# Patient Record
Sex: FEMALE | Race: ASIAN | NOT HISPANIC OR LATINO | Employment: UNEMPLOYED | URBAN - METROPOLITAN AREA
[De-identification: names, ages, dates, MRNs, and addresses within clinical notes are randomized per-mention and may not be internally consistent; named-entity substitution may affect disease eponyms.]

---

## 2020-01-27 ENCOUNTER — OFFICE VISIT - HEALTHEAST (OUTPATIENT)
Dept: FAMILY MEDICINE | Facility: CLINIC | Age: 55
End: 2020-01-27

## 2020-01-27 ENCOUNTER — HOSPITAL ENCOUNTER (OUTPATIENT)
Facility: CLINIC | Age: 55
Setting detail: OBSERVATION
LOS: 1 days | Discharge: HOME | End: 2020-01-28
Attending: FAMILY MEDICINE | Admitting: INTERNAL MEDICINE
Payer: COMMERCIAL

## 2020-01-27 ENCOUNTER — APPOINTMENT (OUTPATIENT)
Dept: GENERAL RADIOLOGY | Facility: CLINIC | Age: 55
End: 2020-01-27
Attending: FAMILY MEDICINE
Payer: COMMERCIAL

## 2020-01-27 ENCOUNTER — APPOINTMENT (OUTPATIENT)
Dept: CT IMAGING | Facility: CLINIC | Age: 55
End: 2020-01-27
Attending: FAMILY MEDICINE
Payer: COMMERCIAL

## 2020-01-27 DIAGNOSIS — R20.2 PARESTHESIAS: ICD-10-CM

## 2020-01-27 DIAGNOSIS — G89.29 CHRONIC NONINTRACTABLE HEADACHE, UNSPECIFIED HEADACHE TYPE: ICD-10-CM

## 2020-01-27 DIAGNOSIS — S09.90XA INJURY OF HEAD, INITIAL ENCOUNTER: ICD-10-CM

## 2020-01-27 DIAGNOSIS — Z11.4 ENCOUNTER FOR SCREENING FOR HIV: ICD-10-CM

## 2020-01-27 DIAGNOSIS — Z11.59 NEED FOR HEPATITIS C SCREENING TEST: ICD-10-CM
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
## 2020-01-27 DIAGNOSIS — R79.89 ELEVATED TROPONIN: ICD-10-CM

## 2020-01-27 DIAGNOSIS — Z11.59 NEED FOR HEPATITIS B SCREENING TEST: ICD-10-CM

## 2020-01-27 DIAGNOSIS — F32.2 SEVERE MAJOR DEPRESSION (H): ICD-10-CM

## 2020-01-27 DIAGNOSIS — R51.9 CHRONIC NONINTRACTABLE HEADACHE, UNSPECIFIED HEADACHE TYPE: ICD-10-CM

## 2020-01-27 DIAGNOSIS — R55 SYNCOPE AND COLLAPSE: ICD-10-CM

## 2020-01-27 DIAGNOSIS — F43.10 PTSD (POST-TRAUMATIC STRESS DISORDER): ICD-10-CM

## 2020-01-27 LAB — HIV 1+2 AB+HIV1 P24 AG SERPL QL IA: NEGATIVE

## 2020-01-27 PROCEDURE — 84443 ASSAY THYROID STIM HORMONE: CPT | Performed by: FAMILY MEDICINE

## 2020-01-27 PROCEDURE — 85610 PROTHROMBIN TIME: CPT | Performed by: FAMILY MEDICINE

## 2020-01-27 PROCEDURE — 86850 RBC ANTIBODY SCREEN: CPT | Performed by: FAMILY MEDICINE

## 2020-01-27 PROCEDURE — 99291 CRITICAL CARE FIRST HOUR: CPT | Mod: 25 | Performed by: FAMILY MEDICINE

## 2020-01-27 PROCEDURE — 83735 ASSAY OF MAGNESIUM: CPT | Performed by: FAMILY MEDICINE

## 2020-01-27 PROCEDURE — 93005 ELECTROCARDIOGRAM TRACING: CPT | Performed by: FAMILY MEDICINE

## 2020-01-27 PROCEDURE — 84484 ASSAY OF TROPONIN QUANT: CPT | Performed by: FAMILY MEDICINE

## 2020-01-27 PROCEDURE — 71046 X-RAY EXAM CHEST 2 VIEWS: CPT

## 2020-01-27 PROCEDURE — 93308 TTE F-UP OR LMTD: CPT | Mod: 26 | Performed by: FAMILY MEDICINE

## 2020-01-27 PROCEDURE — 99285 EMERGENCY DEPT VISIT HI MDM: CPT | Mod: 25 | Performed by: FAMILY MEDICINE

## 2020-01-27 PROCEDURE — 70450 CT HEAD/BRAIN W/O DYE: CPT

## 2020-01-27 PROCEDURE — 93010 ELECTROCARDIOGRAM REPORT: CPT | Mod: Z6 | Performed by: FAMILY MEDICINE

## 2020-01-27 PROCEDURE — 85025 COMPLETE CBC W/AUTO DIFF WBC: CPT | Performed by: FAMILY MEDICINE

## 2020-01-27 PROCEDURE — 25800030 ZZH RX IP 258 OP 636: Performed by: FAMILY MEDICINE

## 2020-01-27 PROCEDURE — 93308 TTE F-UP OR LMTD: CPT | Performed by: FAMILY MEDICINE

## 2020-01-27 PROCEDURE — 86901 BLOOD TYPING SEROLOGIC RH(D): CPT | Performed by: FAMILY MEDICINE

## 2020-01-27 PROCEDURE — 80053 COMPREHEN METABOLIC PANEL: CPT | Performed by: FAMILY MEDICINE

## 2020-01-27 PROCEDURE — 84439 ASSAY OF FREE THYROXINE: CPT | Performed by: FAMILY MEDICINE

## 2020-01-27 PROCEDURE — 96360 HYDRATION IV INFUSION INIT: CPT | Performed by: FAMILY MEDICINE

## 2020-01-27 PROCEDURE — 96374 THER/PROPH/DIAG INJ IV PUSH: CPT | Performed by: FAMILY MEDICINE

## 2020-01-27 PROCEDURE — 96361 HYDRATE IV INFUSION ADD-ON: CPT | Performed by: FAMILY MEDICINE

## 2020-01-27 PROCEDURE — 86900 BLOOD TYPING SEROLOGIC ABO: CPT | Performed by: FAMILY MEDICINE

## 2020-01-27 RX ORDER — LIDOCAINE 40 MG/G
CREAM TOPICAL
Status: DISCONTINUED | OUTPATIENT
Start: 2020-01-27 | End: 2020-01-28 | Stop reason: HOSPADM

## 2020-01-27 RX ORDER — SODIUM CHLORIDE 9 MG/ML
1000 INJECTION, SOLUTION INTRAVENOUS CONTINUOUS
Status: DISCONTINUED | OUTPATIENT
Start: 2020-01-27 | End: 2020-01-28

## 2020-01-27 RX ADMIN — SODIUM CHLORIDE 1000 ML: 9 INJECTION, SOLUTION INTRAVENOUS at 23:05

## 2020-01-27 ASSESSMENT — ENCOUNTER SYMPTOMS
NECK STIFFNESS: 0
CONFUSION: 0
SHORTNESS OF BREATH: 0
FEVER: 0
ARTHRALGIAS: 0
HEADACHES: 1
DIFFICULTY URINATING: 0
ABDOMINAL PAIN: 0
DIARRHEA: 0
EYE REDNESS: 0
COLOR CHANGE: 0

## 2020-01-27 ASSESSMENT — PATIENT HEALTH QUESTIONNAIRE - PHQ9: SUM OF ALL RESPONSES TO PHQ QUESTIONS 1-9: 25

## 2020-01-27 ASSESSMENT — MIFFLIN-ST. JEOR
SCORE: 982.37
SCORE: 993.24

## 2020-01-28 ENCOUNTER — APPOINTMENT (OUTPATIENT)
Dept: CARDIOLOGY | Facility: CLINIC | Age: 55
End: 2020-01-28
Attending: STUDENT IN AN ORGANIZED HEALTH CARE EDUCATION/TRAINING PROGRAM
Payer: COMMERCIAL

## 2020-01-28 VITALS
SYSTOLIC BLOOD PRESSURE: 107 MMHG | BODY MASS INDEX: 27.31 KG/M2 | RESPIRATION RATE: 25 BRPM | WEIGHT: 118 LBS | TEMPERATURE: 98 F | DIASTOLIC BLOOD PRESSURE: 72 MMHG | HEIGHT: 55 IN | OXYGEN SATURATION: 96 % | HEART RATE: 65 BPM

## 2020-01-28 PROBLEM — R55 SYNCOPE: Status: ACTIVE | Noted: 2020-01-28

## 2020-01-28 LAB
25(OH)D3 SERPL-MCNC: 31 NG/ML (ref 30–80)
ABO + RH BLD: NORMAL
ABO + RH BLD: NORMAL
ALBUMIN SERPL-MCNC: 3.7 G/DL (ref 3.4–5)
ALBUMIN UR-MCNC: NEGATIVE MG/DL
ALP SERPL-CCNC: 76 U/L (ref 40–150)
ALT SERPL W P-5'-P-CCNC: 42 U/L (ref 0–50)
ANION GAP SERPL CALCULATED.3IONS-SCNC: 8 MMOL/L (ref 3–14)
APPEARANCE UR: CLEAR
AST SERPL W P-5'-P-CCNC: 23 U/L (ref 0–45)
B BURGDOR IGG+IGM SER QL: 0.09 INDEX VALUE
BACTERIA #/AREA URNS HPF: ABNORMAL /HPF
BASOPHILS # BLD AUTO: 0 10E9/L (ref 0–0.2)
BASOPHILS NFR BLD AUTO: 0.6 %
BILIRUB SERPL-MCNC: 0.2 MG/DL (ref 0.2–1.3)
BILIRUB UR QL STRIP: NEGATIVE
BLD GP AB SCN SERPL QL: NORMAL
BLOOD BANK CMNT PATIENT-IMP: NORMAL
BUN SERPL-MCNC: 11 MG/DL (ref 7–30)
CALCIUM SERPL-MCNC: 8.9 MG/DL (ref 8.5–10.1)
CHLORIDE SERPL-SCNC: 106 MMOL/L (ref 94–109)
CO2 SERPL-SCNC: 24 MMOL/L (ref 20–32)
COLOR UR AUTO: ABNORMAL
CREAT SERPL-MCNC: 0.51 MG/DL (ref 0.52–1.04)
CREAT SERPL-MCNC: 0.62 MG/DL (ref 0.52–1.04)
DIFFERENTIAL METHOD BLD: NORMAL
EOSINOPHIL # BLD AUTO: 0 10E9/L (ref 0–0.7)
EOSINOPHIL NFR BLD AUTO: 0.6 %
ERYTHROCYTE [DISTWIDTH] IN BLOOD BY AUTOMATED COUNT: 11.5 % (ref 10–15)
GFR SERPL CREATININE-BSD FRML MDRD: >90 ML/MIN/{1.73_M2}
GFR SERPL CREATININE-BSD FRML MDRD: >90 ML/MIN/{1.73_M2}
GLUCOSE SERPL-MCNC: 118 MG/DL (ref 70–99)
GLUCOSE UR STRIP-MCNC: NEGATIVE MG/DL
HBV SURFACE AG SERPL QL IA: NEGATIVE
HCT VFR BLD AUTO: 40.6 % (ref 35–47)
HCV AB SERPL QL IA: NEGATIVE
HGB BLD-MCNC: 13.8 G/DL (ref 11.7–15.7)
HGB UR QL STRIP: NEGATIVE
IMM GRANULOCYTES # BLD: 0 10E9/L (ref 0–0.4)
IMM GRANULOCYTES NFR BLD: 0.3 %
INR PPP: 0.98 (ref 0.86–1.14)
INTERPRETATION ECG - MUSE: NORMAL
KETONES UR STRIP-MCNC: NEGATIVE MG/DL
LEUKOCYTE ESTERASE UR QL STRIP: NEGATIVE
LYMPHOCYTES # BLD AUTO: 1.4 10E9/L (ref 0.8–5.3)
LYMPHOCYTES NFR BLD AUTO: 20.6 %
MAGNESIUM SERPL-MCNC: 2.2 MG/DL (ref 1.6–2.3)
MCH RBC QN AUTO: 29.2 PG (ref 26.5–33)
MCHC RBC AUTO-ENTMCNC: 34 G/DL (ref 31.5–36.5)
MCV RBC AUTO: 86 FL (ref 78–100)
MONOCYTES # BLD AUTO: 0.5 10E9/L (ref 0–1.3)
MONOCYTES NFR BLD AUTO: 7 %
NEUTROPHILS # BLD AUTO: 4.9 10E9/L (ref 1.6–8.3)
NEUTROPHILS NFR BLD AUTO: 70.9 %
NITRATE UR QL: NEGATIVE
NRBC # BLD AUTO: 0 10*3/UL
NRBC BLD AUTO-RTO: 0 /100
PH UR STRIP: 6 PH (ref 5–7)
PLATELET # BLD AUTO: 221 10E9/L (ref 150–450)
PLATELET # BLD AUTO: 239 10E9/L (ref 150–450)
POTASSIUM SERPL-SCNC: 3.6 MMOL/L (ref 3.4–5.3)
PROT SERPL-MCNC: 7.2 G/DL (ref 6.8–8.8)
RBC # BLD AUTO: 4.73 10E12/L (ref 3.8–5.2)
RBC #/AREA URNS AUTO: 0 /HPF (ref 0–2)
SODIUM SERPL-SCNC: 138 MMOL/L (ref 133–144)
SOURCE: ABNORMAL
SP GR UR STRIP: 1 (ref 1–1.03)
SPECIMEN EXP DATE BLD: NORMAL
T3FREE SERPL-MCNC: 2.3 PG/ML (ref 2.3–4.2)
T4 FREE SERPL-MCNC: 0.97 NG/DL (ref 0.76–1.46)
TROPONIN I SERPL-MCNC: 0.08 UG/L (ref 0–0.04)
TROPONIN I SERPL-MCNC: 0.14 UG/L (ref 0–0.04)
TROPONIN I SERPL-MCNC: 0.16 UG/L (ref 0–0.04)
TSH SERPL DL<=0.005 MIU/L-ACNC: 7 MU/L (ref 0.4–4)
UROBILINOGEN UR STRIP-MCNC: NORMAL MG/DL (ref 0–2)
WBC # BLD AUTO: 6.9 10E9/L (ref 4–11)
WBC #/AREA URNS AUTO: 0 /HPF (ref 0–5)

## 2020-01-28 PROCEDURE — 93306 TTE W/DOPPLER COMPLETE: CPT | Mod: 26 | Performed by: INTERNAL MEDICINE

## 2020-01-28 PROCEDURE — 96372 THER/PROPH/DIAG INJ SC/IM: CPT | Performed by: FAMILY MEDICINE

## 2020-01-28 PROCEDURE — 82565 ASSAY OF CREATININE: CPT | Performed by: STUDENT IN AN ORGANIZED HEALTH CARE EDUCATION/TRAINING PROGRAM

## 2020-01-28 PROCEDURE — 25000132 ZZH RX MED GY IP 250 OP 250 PS 637: Performed by: FAMILY MEDICINE

## 2020-01-28 PROCEDURE — 84484 ASSAY OF TROPONIN QUANT: CPT | Performed by: PHYSICIAN ASSISTANT

## 2020-01-28 PROCEDURE — 96361 HYDRATE IV INFUSION ADD-ON: CPT

## 2020-01-28 PROCEDURE — 25000128 H RX IP 250 OP 636: Performed by: STUDENT IN AN ORGANIZED HEALTH CARE EDUCATION/TRAINING PROGRAM

## 2020-01-28 PROCEDURE — 84484 ASSAY OF TROPONIN QUANT: CPT | Performed by: STUDENT IN AN ORGANIZED HEALTH CARE EDUCATION/TRAINING PROGRAM

## 2020-01-28 PROCEDURE — 81001 URINALYSIS AUTO W/SCOPE: CPT | Performed by: FAMILY MEDICINE

## 2020-01-28 PROCEDURE — 25800030 ZZH RX IP 258 OP 636: Performed by: PHYSICIAN ASSISTANT

## 2020-01-28 PROCEDURE — 84481 FREE ASSAY (FT-3): CPT | Performed by: STUDENT IN AN ORGANIZED HEALTH CARE EDUCATION/TRAINING PROGRAM

## 2020-01-28 PROCEDURE — 99220 ZZC INITIAL OBSERVATION CARE,LEVL III: CPT | Mod: AI | Performed by: INTERNAL MEDICINE

## 2020-01-28 PROCEDURE — 25500064 ZZH RX 255 OP 636: Performed by: INTERNAL MEDICINE

## 2020-01-28 PROCEDURE — G0378 HOSPITAL OBSERVATION PER HR: HCPCS

## 2020-01-28 PROCEDURE — 25000132 ZZH RX MED GY IP 250 OP 250 PS 637: Performed by: STUDENT IN AN ORGANIZED HEALTH CARE EDUCATION/TRAINING PROGRAM

## 2020-01-28 PROCEDURE — 25000132 ZZH RX MED GY IP 250 OP 250 PS 637

## 2020-01-28 PROCEDURE — 85049 AUTOMATED PLATELET COUNT: CPT | Performed by: STUDENT IN AN ORGANIZED HEALTH CARE EDUCATION/TRAINING PROGRAM

## 2020-01-28 RX ORDER — NITROGLYCERIN 0.4 MG/1
0.4 TABLET SUBLINGUAL EVERY 5 MIN PRN
Status: DISCONTINUED | OUTPATIENT
Start: 2020-01-28 | End: 2020-01-28 | Stop reason: HOSPADM

## 2020-01-28 RX ORDER — LIDOCAINE 40 MG/G
CREAM TOPICAL
Status: DISCONTINUED | OUTPATIENT
Start: 2020-01-28 | End: 2020-01-28 | Stop reason: HOSPADM

## 2020-01-28 RX ORDER — ASPIRIN 81 MG/1
324 TABLET, CHEWABLE ORAL ONCE
Status: COMPLETED | OUTPATIENT
Start: 2020-01-28 | End: 2020-01-28

## 2020-01-28 RX ORDER — MIRTAZAPINE 15 MG/1
15 TABLET, FILM COATED ORAL AT BEDTIME
Status: DISCONTINUED | OUTPATIENT
Start: 2020-01-29 | End: 2020-01-28 | Stop reason: HOSPADM

## 2020-01-28 RX ORDER — SERTRALINE HYDROCHLORIDE 100 MG/1
100 TABLET, FILM COATED ORAL DAILY
Status: DISCONTINUED | OUTPATIENT
Start: 2020-01-28 | End: 2020-01-28 | Stop reason: HOSPADM

## 2020-01-28 RX ORDER — POLYETHYLENE GLYCOL 3350 17 G/17G
17 POWDER, FOR SOLUTION ORAL DAILY PRN
Status: DISCONTINUED | OUTPATIENT
Start: 2020-01-28 | End: 2020-01-28 | Stop reason: HOSPADM

## 2020-01-28 RX ORDER — DOCUSATE SODIUM 100 MG/1
100 CAPSULE, LIQUID FILLED ORAL 2 TIMES DAILY
Status: DISCONTINUED | OUTPATIENT
Start: 2020-01-28 | End: 2020-01-28 | Stop reason: HOSPADM

## 2020-01-28 RX ORDER — SODIUM CHLORIDE 9 MG/ML
INJECTION, SOLUTION INTRAVENOUS CONTINUOUS
Status: DISCONTINUED | OUTPATIENT
Start: 2020-01-28 | End: 2020-01-28 | Stop reason: HOSPADM

## 2020-01-28 RX ORDER — ALUMINA, MAGNESIA, AND SIMETHICONE 2400; 2400; 240 MG/30ML; MG/30ML; MG/30ML
30 SUSPENSION ORAL EVERY 4 HOURS PRN
Status: DISCONTINUED | OUTPATIENT
Start: 2020-01-28 | End: 2020-01-28 | Stop reason: HOSPADM

## 2020-01-28 RX ORDER — ACETAMINOPHEN 650 MG/1
650 SUPPOSITORY RECTAL EVERY 4 HOURS PRN
Status: DISCONTINUED | OUTPATIENT
Start: 2020-01-28 | End: 2020-01-28 | Stop reason: HOSPADM

## 2020-01-28 RX ORDER — ACETAMINOPHEN 325 MG/1
650 TABLET ORAL EVERY 4 HOURS PRN
Status: DISCONTINUED | OUTPATIENT
Start: 2020-01-28 | End: 2020-01-28 | Stop reason: HOSPADM

## 2020-01-28 RX ADMIN — HUMAN ALBUMIN MICROSPHERES AND PERFLUTREN 5 ML: 10; .22 INJECTION, SOLUTION INTRAVENOUS at 07:12

## 2020-01-28 RX ADMIN — SODIUM CHLORIDE: 9 INJECTION, SOLUTION INTRAVENOUS at 08:48

## 2020-01-28 RX ADMIN — ASPIRIN 81 MG 324 MG: 81 TABLET ORAL at 01:42

## 2020-01-28 RX ADMIN — ENOXAPARIN SODIUM 40 MG: 40 INJECTION SUBCUTANEOUS at 08:47

## 2020-01-28 RX ADMIN — DOCUSATE SODIUM 100 MG: 100 CAPSULE, LIQUID FILLED ORAL at 08:47

## 2020-01-28 RX ADMIN — SERTRALINE HYDROCHLORIDE 100 MG: 100 TABLET ORAL at 08:47

## 2020-01-28 ASSESSMENT — ENCOUNTER SYMPTOMS
SPEECH DIFFICULTY: 0
PALPITATIONS: 0
SEIZURES: 0
NAUSEA: 0
APPETITE CHANGE: 0
TROUBLE SWALLOWING: 0
FLANK PAIN: 0
DYSPHORIC MOOD: 0
BRUISES/BLEEDS EASILY: 0
VOICE CHANGE: 0
VOMITING: 0
COUGH: 0
WOUND: 0
HALLUCINATIONS: 0
WEAKNESS: 0
BACK PAIN: 0
ACTIVITY CHANGE: 1
SINUS PAIN: 0
DECREASED CONCENTRATION: 0

## 2020-01-28 NOTE — H&P
"  Cardiology History and Physical  Patient Name: Mehul Ambriz  Age: 54 year old  YOB: 1965  MRN# [id number]  Date of Admission:1/27/2020  Date of Service: 1/28/2020         Assessment and Plan:   Mehul Ambriz is a 53yo La[place]ian F w/ PMHx notable for MDD/PTSD, SI who is admitted following syncopal episode, found to have elevated troponin without EKG changes or chest pain.     #Elevated troponin  Troponin 0.076->0.158 on admission. Denies recent or current chest pain. EKG is without TWI or ST changes suggestive of acute ischemia. Very limited risk factors for true ACS/CAD as no prior h/o CAD, nonsmoker, no alcohol, normal BMI. FHx: mother w/ \"weak heart\" and need for ICD vs PPM. Suspect troponin elevated in setting of demand ischemia w/ recent prodrome of orthostatic spell, syncope. However, will continue to trend trop & EKG  -Trend troponin: 0.076-> 0.158->ordered for 06:30  -S/p 324 ASA  -EKG: NSR, trend PRN  -Admit to cardiology, telemetry  -ECHO in AM    #Syncopal episode  History of blurred vision, unsteadiness, and palpitations following standing quickly from warm bath with recent first time dose of prazosin. No known prior structural heart disease or h/o arrhythmia. No orthostatics taken in ED. S/p 1L IVF. Normotensive. Etiology of fall most c/w orthostatic syncope but cannot yet exclude cardiac w/ rising troponin. Does not appear neuromediated.   -Cardiac eval as noted above  -ECHO ordered for AM  -Up with assist    #MDD/PTSD  Recently admitted 1/13-1/20 for SI. No prior h/o AVH. Started on lorazepam 0.5mg BID.   -Continue PTA Sertraline 100mg, mirtazapine 15mg at bedtime; consider adding lorazepam if needed (not ordered)  -Holding PTA prazosin    #Subclinical Hypothyroidism  TSH last checked 1.45 10/28/19. Now 7.0 on admission (mildly elevated). Does provide h/o symptoms consistent with hypothyroidism (fatigue, poor sleep, constipation) but also c/w MDD.   -Reflex to fT4, T3  -Consider serum " "thyroid peroxidase antibody testing*    FEN: Regular Diet  Ppx: Enoxaparin  Dispo: Suspect will return back home without any additional needs once syncopal work up complete  CODE: DNR/DNI    Patient will be staffed in the AM.    Silvia Hansen MD, MPH  Internal Medicine PGY-2  [facility] [place]    *Pete MENDEZ, Lonny LAY, Emerita PINZON, et al. Screening and Treatment of Subclinical Hypothyroidism or Hyperthyroidism [Internet]. Adam ISSA): Agency for Healthcare Research and Quality ([place]); 2011 Oct. (Comparative Effectiveness Reviews, No. 24.) Introduction. Available from: https://www.ncbi.nlm.nih.gov/books/HXJ19913/         Chief Complaint:   Fall         HPI:   Mehul Ambriz is a 53yo F from [facility] w/ PMHx notable for MDD/PTSD w/ recent admission for suicidal ideation 1/13-1/20 who is admitted following suspected syncopal episode. She states she was in her usual state of health until earlier this evening when she sustained a fall from standing w/ LOC. She recalls standing up from her warm bath then developing chest palpitations, body \"shakiness\" and blurry vision. She suspects she was passed out for about twenty minutes; she does not recall the actual fall. She was just seen earlier in the day (1/27/20) for post hospitalization follow up, where the provider prescribed prazosin 1mg-2mg at bedtime for PTSD. She had just taken her first dose ~1hr prior to syncopal episode. She describes one prior episode of lightheadedness with changes in vision, which also occurred as she stood up quickly. During that episode she states she was emotionally distraught and sought care at ED.    She denies any current or recent c/o chest pain, palpitations (outside of recent event), SOB, VELASQUEZ. She has ongoing nerve pain and \"full body burning.\" ROS also positive for constipation, fatigue, poor sleep, low energy, poor concentration. No incontinence or tongue biting. Family history notable for mother with unspecified heart condition, but noted " "as \"weak\" with need for what sounds like ICD placement.     While in the ED:  -Normotensive, afebrile (no orthostatics taken)  -S/p 1L IVF, 324 ASA         Past Medical History:     Past Medical History:   Diagnosis Date     Depressive disorder           Past Surgical History:   History reviewed. No pertinent surgical history.         Social History:     Social History     Socioeconomic History     Marital status:      Spouse name: Not on file     Number of children: Not on file     Years of education: Not on file     Highest education level: Not on file   Occupational History     Not on file   Social Needs     Financial resource strain: Not on file     Food insecurity:     Worry: Not on file     Inability: Not on file     Transportation needs:     Medical: Not on file     Non-medical: Not on file   Tobacco Use     Smoking status: Never Smoker     Smokeless tobacco: Never Used   Substance and Sexual Activity     Alcohol use: Not Currently     Drug use: Never     Sexual activity: Not on file   Lifestyle     Physical activity:     Days per week: Not on file     Minutes per session: Not on file     Stress: Not on file   Relationships     Social connections:     Talks on phone: Not on file     Gets together: Not on file     Attends Confucianist service: Not on file     Active member of club or organization: Not on file     Attends meetings of clubs or organizations: Not on file     Relationship status: Not on file     Intimate partner violence:     Fear of current or ex partner: Not on file     Emotionally abused: Not on file     Physically abused: Not on file     Forced sexual activity: Not on file   Other Topics Concern     Not on file   Social History Narrative     Not on file          Family History:   Family history reviewed. States mother had \"weak heart\" which required what sounds like a pacemaker. Otherwise, non contributory.         Immunizations:     There is no immunization history on file for this " "patient.           Allergies:   No Known Allergies         Medications:     None           Review of Systems:   A complete, 10 point ROS was performed and is negative other than what is stated in the HPI.         Physical Exam:   Blood pressure 113/58, temperature 98  F (36.7  C), temperature source Oral, resp. rate 16, height 1.397 m (4' 7\"), weight 53.5 kg (118 lb), SpO2 98 %.    General:  Alert, lying in bed, no acute distress.  HEENT: MMM, EOMI, PERRL, anicteric sclera, no cervical or submandibular LAD  CV: Regular rate, regular rhythm. S1, S2+. No murmurs. No elevated JVD.  Resp: Clear to auscultation bilaterally, bronchial breath sounds, no wheezes or crackles  Abdomen: Bowel sounds +, nondistended, soft, nontender, no hepatosplenomegaly, no masses.  Extremities: No peripheral edema, warm and well perfused.  Skin: No rashes, bruising, or jaundice.  Neuro: Alert, symmetric facial expressions, moving extremities equally  Psych: Appropriate affect.         Data:   Labs reviewed and notable for:  Trop 0.076  CBC wnl, INR wnl  TSH 7.0  UA neg blood nitrite and esterase    EKG: NSR, normal Qtc. No dropped beats or MN prolongation. Prior EKG @ OSH, bradycardia w/ HR 54, otherwise unchanged.    CT Head:  IMPRESSION:  1.  No acute intracranial process.    CXR: IMPRESSION: Clear chest.    "

## 2020-01-28 NOTE — ED NOTES
[facility], [place]   ED Nurse to Floor Handoff     Mehul Ambriz is a 54 year old female who speaks Hmong and lives with a spouse,  in a home  They arrived in the ED by car from home    ED Chief Complaint: Loss of Consciousness    ED Dx;   Final diagnoses:   Syncope and collapse   Injury of head, initial encounter         Needed?: Yes    Allergies: No Known Allergies.  Past Medical Hx:   Past Medical History:   Diagnosis Date     Depressive disorder       Baseline Mental status: WDL  Current Mental Status changes: at basesline    Infection present or suspected this encounter: no  Sepsis suspected: No  Isolation type: No active isolations     Activity level - Baseline/Home:  Independent  Activity Level - Current:   Independent    Bariatric equipment needed?: No    In the ED these meds were given:   Medications   lidocaine 1 % 0.1-1 mL (has no administration in time range)   lidocaine (LMX4) cream (has no administration in time range)   sodium chloride (PF) 0.9% PF flush 3 mL (has no administration in time range)   sodium chloride (PF) 0.9% PF flush 3 mL (0 mLs Intracatheter Hold 1/27/20 2306)   lidocaine 1 % 0.1-1 mL (has no administration in time range)   lidocaine (LMX4) cream (has no administration in time range)   sodium chloride (PF) 0.9% PF flush 3 mL (has no administration in time range)   sodium chloride (PF) 0.9% PF flush 3 mL (has no administration in time range)   medication instruction (has no administration in time range)   nitroGLYcerin (NITROSTAT) sublingual tablet 0.4 mg (has no administration in time range)   alum & mag hydroxide-simethicone (MYLANTA ES/MAALOX  ES) suspension 30 mL (has no administration in time range)   acetaminophen (TYLENOL) tablet 650 mg (has no administration in time range)   acetaminophen (TYLENOL) Suppository 650 mg (has no administration in time range)   enoxaparin ANTICOAGULANT (LOVENOX) injection 40 mg (has no administration in time  range)   docusate sodium (COLACE) capsule 100 mg (has no administration in time range)   polyethylene glycol (MIRALAX/GLYCOLAX) Packet 17 g (has no administration in time range)   sertraline (ZOLOFT) tablet 100 mg (has no administration in time range)   mirtazapine (REMERON) tablet 15 mg (has no administration in time range)   0.9% sodium chloride BOLUS (0 mLs Intravenous Stopped 1/28/20 0010)   aspirin (ASA) chewable tablet 324 mg (324 mg Oral Given 1/28/20 0142)       Drips running?  No    Home pump  No    Current LDAs  Peripheral IV 01/27/20 Left Upper arm (Active)   Site Assessment WDL 1/27/2020 11:01 PM   Line Status Infusing 1/27/2020 11:01 PM   Phlebitis Scale 0-->no symptoms 1/27/2020 11:01 PM   Infiltration Scale 0 1/27/2020 11:01 PM   Extravasation? No 1/27/2020 11:01 PM   Number of days: 1       Labs results:   Labs Ordered and Resulted from Time of ED Arrival Up to the Time of Departure from the ED   COMPREHENSIVE METABOLIC PANEL - Abnormal; Notable for the following components:       Result Value    Glucose 118 (*)     All other components within normal limits   TROPONIN I - Abnormal; Notable for the following components:    Troponin I ES 0.076 (*)     All other components within normal limits   TSH - Abnormal; Notable for the following components:    TSH 7.00 (*)     All other components within normal limits   ROUTINE UA WITH MICROSCOPIC REFLEX TO CULTURE - Abnormal; Notable for the following components:    Bacteria Urine Few (*)     All other components within normal limits   TROPONIN I - Abnormal; Notable for the following components:    Troponin I ES 0.158 (*)     All other components within normal limits   CREATININE - Abnormal; Notable for the following components:    Creatinine 0.51 (*)     All other components within normal limits   CBC WITH PLATELETS DIFFERENTIAL   INR   MAGNESIUM   PLATELET COUNT   T4 FREE   PARTIAL THROMBOPLASTIN TIME   ORTHOSTATIC BLOOD PRESSURE AND PULSE   PERIPHERAL IV  "CATHETER   CARDIAC CONTINUOUS MONITORING   PULSE OXIMETRY NURSING   IP ASSIGN PROVIDER TEAM TO TREATMENT TEAM   DAILY WEIGHTS   INTAKE AND OUTPUT   OBTAIN MEDICAL RECORDS   DOCUMENT   PATIENT EDUCATION   TELEMETRY MONITORING CARDIOLOGY ADULT   VITAL SIGNS AND PAIN ASSESSMENT   PULSE OXIMETRY NURSING   PERIPHERAL IV CATHETER   ACTIVITY   NOTIFY PROVIDER   ORTHOSTATIC BLOOD PRESSURE AND PULSE   ABO/RH TYPE AND SCREEN       Imaging Studies:   Recent Results (from the past 24 hour(s))   XR Chest 2 Views    Narrative    XR CHEST 2 VW  1/27/2020 11:15 PM      HISTORY: syncope and head injury    COMPARISON: None    FINDINGS: Critical is within normal limits. No pleural effusion or  pneumothorax. No focal airspace opacity.      Impression    IMPRESSION: Clear chest.   CT Head w/o Contrast    Narrative    EXAM: CT HEAD W/O CONTRAST  LOCATION: [facility]  DATE/TIME: 1/27/2020 11:18 PM    INDICATION: Head injury. Syncope.  COMPARISON: 11/12/2019.  TECHNIQUE: Routine without IV contrast. Multiplanar reformats. Dose reduction techniques were used.    FINDINGS:  INTRACRANIAL CONTENTS: No intracranial hemorrhage, extraaxial collection, or mass effect.  No CT evidence of acute infarct. Normal parenchymal attenuation. Normal ventricles and sulci.     VISUALIZED ORBITS/SINUSES/MASTOIDS: No intraorbital abnormality. No paranasal sinus mucosal disease. No middle ear or mastoid effusion.    BONES/SOFT TISSUES: No acute abnormality. Left posterior scalp soft tissue contusion.      Impression    IMPRESSION:  1.  No acute intracranial process.       Recent vital signs:   BP 99/66   Temp 98  F (36.7  C) (Oral)   Resp 16   Ht 1.397 m (4' 7\")   Wt 53.5 kg (118 lb)   SpO2 98%   BMI 27.43 kg/m      [place] Coma Scale Score: 15 (01/28/20 0018)       Cardiac Rhythm: Normal Sinus  Pt needs tele? Yes  Skin/wound Issues: None    Code Status: DNR/DNI    Pain control: pt had none    Nausea control: pt had none    Abnormal " labs/tests/findings requiring intervention: trop 0.158, TSH 7.0, see epic    Family present during ED course? Yes   Family Comments/Social Situation comments: spouse at bedside    Tasks needing completion: None    Tabatha Fluhrer, RN    [phone] [facility]

## 2020-01-28 NOTE — DISCHARGE SUMMARY
[address] [address]  p: [phone]    Discharge Summary: Cardiology Service    Mehul Ambriz MRN# [id number]   YOB: 1965 Age: 54 year old       Admission Date: 1/27/2020  Discharge Date: 01/28/20      Discharge Diagnoses:  1. Syncope  2. Elevated troponin   3. Major depressive disorder  4. PTSD  5. Subclinical hypothyroidism      Pertinent Procedures:  1. NA    Consults:  NA    Imaging with results:  Echocardiogram 1/28/2020:  Interpretation Summary  Global and regional left ventricular function is hyperkinetic with an EF of  65-70%.  Right ventricular function, chamber size, wall motion, and thickness are  normal.  No significant valvular dysfunction present.  The inferior vena cava was normal in size with preserved respiratory  variability.  No pericardial effusion is present.     There is no prior study for direct comparison.  ________________________________________________________________________     Left Ventricle  Left ventricular size is normal. Left ventricular wall thickness is normal.  Global and regional left ventricular function is hyperkinetic with an EF of  65-70%. Left ventricular diastolic function is normal.     Right Ventricle  Right ventricular function, chamber size, wall motion, and thickness are  normal.     Atria  Both atria appear normal.     Mitral Valve  The mitral valve is normal.        Aortic Valve  Aortic valve is normal in structure and function. The aortic valve is  tricuspid. No aortic regurgitation is present.     Tricuspid Valve  The tricuspid valve is normal. Trace tricuspid insufficiency is present. Right  ventricular systolic pressure is 23mmHg above the right atrial pressure.     Pulmonic Valve  The pulmonic valve is normal.     Vessels  The inferior vena cava was normal in size with preserved respiratory  variability. The aorta root is normal. Ascending aorta 2.7 cm.     Pericardium  No  "pericardial effusion is present.        Compared to Previous Study  There is no prior study for direct comparison.    EKG 1/27/2020      Brief HPI:  Mehul Ambriz is a 55yo La[place] F w/ PMHx notable for MDD/PTSD, SI who is admitted following syncopal episode, found to have elevated troponin without EKG changes or chest pain.     Hospital Course by Diagnosis:  #Elevated troponin  ##Demand ischemia  Troponin 0.076->0.158 on admission. Denies recent or current chest pain. EKG is without TWI or ST changes suggestive of acute ischemia. Very limited risk factors for true ACS/CAD as no prior h/o CAD, nonsmoker, no alcohol, normal BMI, no known hx of HTN or HLD, non-diabetic. FHx: mother w/ \"weak heart\" and need for ICD vs PPM. Suspect troponin elevated in setting of demand ischemia w/ recent prodrome of orthostatic spell, syncope. Patient symptomatically felt better after fluid bolus. Trop peaked quickly at 0.158. Remained symptom free throughout hospitalization. TTE on HOD 1 demonstrated normal LVEF, no WMA.   - stress echo as outpatient. Follow up with cardiology after     #Syncopal episode  Recently prescribed prazosin for PTSD/nightmares. Took first dose prior to hot bath on day of admission. Stood from the bath and immediately felt dizzy, \"tingly\", and reported blurred vision - subsequently fell and hit back of her head (no laceration or ecchymosis). S/p 1L IVF with normal BP and resolution of dizziness. Etiology of fall most c/w orthostatic syncope.  Does not appear neuromediated, did not have post-ictal state, did not lose control of bowel/bladder.   - recommended stopping prazosin and discuss with PCP re: alternatives     #MDD/PTSD  Recently admitted 1/13-1/20 for SI. No prior h/o AVH. Started on lorazepam 0.5mg BID.   -Continue PTA Sertraline 100mg, mirtazapine 15mg at bedtime; consider adding lorazepam if needed (not ordered)  -prazosin as above     #Subclinical Hypothyroidism  TSH last checked 1.45 10/28/19. Now " 7.0 on admission (mildly elevated). Does provide h/o symptoms consistent with hypothyroidism (fatigue, poor sleep, constipation) but also c/w MDD.   - follow up with PCP      Condition on discharge  Temp:  [98  F (36.7  C)] 98  F (36.7  C)  Pulse:  [65-75] 65  Heart Rate:  [60-76] 67  Resp:  [15-24] 17  BP: ()/(53-83) 111/72  SpO2:  [96 %-98 %] 97 %  General: Alert, interactive, NAD  Eyes: sclera anicteric, EOMI  Neck: JVP not appreciably elevated  Cardiovascular: regular rate and rhythm, normal S1 and S2, no murmurs, gallops, or rubs  Resp: clear to auscultation bilaterally, no rales, wheezes, or rhonchi  GI: Soft, nontender, nondistended. +BS.  No HSM or masses, no rebound or guarding.  Extremities: no edema, no cyanosis or clubbing, dorsalis pedis and posterior tibialis pulses 2+ bilaterally  Skin: Warm and dry, no jaundice or rash  Neuro: CN 2-12 intact, moves all extremities equally  Psych: Alert & oriented x 3, depressed affect    Medication Changes:  Discontinue prazosin    Discharge medications:   There are no discharge medications for this patient.      Labs or imaging requiring follow-up after discharge:  Stress echocardiogram within 1 week of discharge. Patient to call and schedule      Follow-up:  Follow up with cardiology in 3-4 weeks pending results of stress echo    Code status:  DNR/DNI      Patient seen and discussed with Dr. Levin. The above reflects our joint plan.     Tobias Ferrera PA-C  [facility] Cardiology Team

## 2020-01-28 NOTE — ED TRIAGE NOTES
Pt arrives to ED with complaints of having a syncopal episode. Pt reports she was getting out of the bath tube when she passed out hitting the back of her head. Pt believe she was unconscious for about 20 min. Pt denies any neck pain but does complain of mild pain to the back of her head. VSS

## 2020-01-28 NOTE — ED PROVIDER NOTES
History     Chief Complaint   Patient presents with     Loss of Consciousness     HPI  Mehul Ambriz is a 54 year old female with a history of depression who presents to the Emergency Department today for evaluation following a syncopal event. The patient reports that she took a warm bath this evening. She got out of the tub and was drying off when she blacked out. The patient denies having symptoms prior to her blacking out including chest pain, shortness of breath, dizziness, or lightheadedness. The patient states that she woke up in the tub. She hit the back of her head. She does not have a laceration or bump where she hit her head, thought does note pain. The patient predicts that she was unconscious for about 20 minutes and denies being confused following. She did not bite her tongue or lose control of bowel or bladder. The patient is not on blood thinners. She denies neck pain or back pain. The patient otherwise notes that she has been dealing with fatigue and weakness lately. She states that she has been sleeping a lot. The daughter in law states that she has been evaluated for this multiple times and have not found a cause. She denies any recent heavy bleeding or diarrhea.     I have reviewed the Medications, Allergies, Past Medical and Surgical History, and Social History in the [employer] system.    Past Medical History:   Diagnosis Date     Depressive disorder        History reviewed. No pertinent surgical history.    History reviewed. No pertinent family history.    Social History     Tobacco Use     Smoking status: Never Smoker     Smokeless tobacco: Never Used   Substance Use Topics     Alcohol use: Not Currently       No current facility-administered medications for this encounter.      No current outpatient medications on file.      No Known Allergies     Review of Systems   Constitutional: Positive for activity change. Negative for appetite change and fever.   HENT: Negative for congestion, nosebleeds,  "sinus pain, tinnitus, trouble swallowing and voice change.    Eyes: Negative for redness and visual disturbance.   Respiratory: Negative for cough and shortness of breath.    Cardiovascular: Negative for chest pain, palpitations and leg swelling.   Gastrointestinal: Negative for abdominal pain, diarrhea, nausea and vomiting.   Genitourinary: Negative for difficulty urinating, flank pain and vaginal bleeding.   Musculoskeletal: Negative for arthralgias, back pain, gait problem and neck stiffness.   Skin: Negative for color change and wound.   Neurological: Positive for syncope and headaches. Negative for seizures, speech difficulty and weakness.   Hematological: Does not bruise/bleed easily.   Psychiatric/Behavioral: Negative for confusion, decreased concentration, dysphoric mood and hallucinations.   All other systems reviewed and are negative.    Physical Exam   BP: 104/59  Pulse: 66  Heart Rate: 75  Temp: 98  F (36.7  C)  Resp: 16  Height: 139.7 cm (4' 7\")  Weight: 53.5 kg (118 lb)  SpO2: 97 %    Physical Exam  Vitals signs and nursing note reviewed.   Constitutional:       General: She is not in acute distress.     Appearance: She is well-developed. She is not diaphoretic.      Comments: With her daughter interpreting initially here with  also.  Patient is baseline at this point no distress.  Acting normal per daughter.   HENT:      Head: Normocephalic and atraumatic.      Right Ear: Tympanic membrane normal.      Left Ear: Tympanic membrane normal.      Nose: Nose normal.      Mouth/Throat:      Pharynx: No oropharyngeal exudate or posterior oropharyngeal erythema.   Eyes:      General: No scleral icterus.     Extraocular Movements: Extraocular movements intact.      Conjunctiva/sclera: Conjunctivae normal.      Pupils: Pupils are equal, round, and reactive to light.   Neck:      Musculoskeletal: Normal range of motion and neck supple. No neck rigidity or muscular tenderness.   Cardiovascular:      Rate " and Rhythm: Normal rate and regular rhythm.      Heart sounds: No murmur. No friction rub.   Pulmonary:      Effort: Pulmonary effort is normal. No respiratory distress.      Breath sounds: Normal breath sounds.   Chest:      Chest wall: No tenderness.   Abdominal:      General: There is no distension.      Palpations: There is no mass.      Tenderness: There is no abdominal tenderness. There is no guarding.      Hernia: No hernia is present.   Musculoskeletal:         General: No swelling, tenderness or deformity.   Skin:     General: Skin is warm and dry.      Capillary Refill: Capillary refill takes less than 2 seconds.      Coloration: Skin is not jaundiced or pale.      Findings: No erythema or rash.   Neurological:      General: No focal deficit present.      Mental Status: She is alert and oriented to person, place, and time. Mental status is at baseline.   Psychiatric:         Mood and Affect: Mood normal.         Behavior: Behavior normal.         Thought Content: Thought content normal.         Judgment: Judgment normal.         ED Course   10:35 PM  The patient was seen and examined by Dr. Ramirez in Room 35.       Procedures  Results for orders placed during the hospital encounter of 01/27/20   POC US ECHO LIMITED    Impression Limited Bedside Cardiac Ultrasound, performed and interpreted by me.   Indication: syncope.  Parasternal long axis, parasternal short axis and apical 4 chamber views were acquired.   Image quality was satisfactory.    Findings:    Global left ventricular function appears intact.  Chambers do not appear dilated.  There is trace of free fluid within the pericardium.    IMPRESSION: Grossly normal left ventricular function and chamber size.  trace pericardial effusion..           Patient valuate here in the ER.  At this point neurological examination not revealing focal findings no sign of any significant trauma.  Patient had EKG done revealing normal sinus rhythm not hyperacute  ischemic changes no rhythm changes noted.  She is continue monitored in the ER and given a liter of fluid.  Further excess evaluation in the ER head CT was done which was negative for acute bleeding fracture swelling sinuses disease etc.  Laboratory testing CBC chemistries otherwise within normal limits but patient troponin noted be slightly elevated 0.076 what is it visiting mental activity thank you so much appreciated.  TSH is also noted be 7.00.  T4 was added.  Bedside echo done by myself also just revealed some mild sluggishness with trace pericardial effusion.  No other significant abnormality seen.  Patient given 324 mg of baby aspirin here in the ER.  Discussed with the  several times reassess patient several times throughout the ER course she is been stable here in the ER without decompensation.  Discussed case with cardiology along with the fellow at this point will plan admit to cardiology service with syncope and collapse with out any sign of traumatic injury head CT negative but also elevated troponin rule out cardiac etiology.  Patient family agree with plan at this point patient stable has been noted multiple times reassessed and will be admitted to cardiology telemetry unit.         EKG Interpretation:      Interpreted by Geoffrey aRmirez MD  Time reviewed: 2228  Symptoms at time of EKG: syncope   Rhythm: normal sinus   Rate: normal  Axis: normal  Ectopy: none  Conduction: normal  ST Segments/ T Waves: No hyperacute ST-T wave changes  Q Waves: none  Comparison to prior: No old EKG available    Clinical Impression: normal EKG          Critical Care time:  was 30 minutes for this patient excluding procedures.             Labs Ordered and Resulted from Time of ED Arrival Up to the Time of Departure from the ED   COMPREHENSIVE METABOLIC PANEL - Abnormal; Notable for the following components:       Result Value    Glucose 118 (*)     All other components within normal limits   TROPONIN I -  Abnormal; Notable for the following components:    Troponin I ES 0.076 (*)     All other components within normal limits   TSH - Abnormal; Notable for the following components:    TSH 7.00 (*)     All other components within normal limits   ROUTINE UA WITH MICROSCOPIC REFLEX TO CULTURE - Abnormal; Notable for the following components:    Bacteria Urine Few (*)     All other components within normal limits   TROPONIN I - Abnormal; Notable for the following components:    Troponin I ES 0.158 (*)     All other components within normal limits   CREATININE - Abnormal; Notable for the following components:    Creatinine 0.51 (*)     All other components within normal limits   TROPONIN I - Abnormal; Notable for the following components:    Troponin I ES 0.135 (*)     All other components within normal limits   CBC WITH PLATELETS DIFFERENTIAL   INR   MAGNESIUM   PLATELET COUNT   T4 FREE   T3 FREE   ORTHOSTATIC BLOOD PRESSURE AND PULSE   PERIPHERAL IV CATHETER   FINAL DISCHARGE APPROVAL   ABO/RH TYPE AND SCREEN           Results for orders placed or performed during the hospital encounter of 01/27/20   CT Head w/o Contrast     Status: None    Narrative    EXAM: CT HEAD W/O CONTRAST  LOCATION: [facility]  DATE/TIME: 1/27/2020 11:18 PM    INDICATION: Head injury. Syncope.  COMPARISON: 11/12/2019.  TECHNIQUE: Routine without IV contrast. Multiplanar reformats. Dose reduction techniques were used.    FINDINGS:  INTRACRANIAL CONTENTS: No intracranial hemorrhage, extraaxial collection, or mass effect.  No CT evidence of acute infarct. Normal parenchymal attenuation. Normal ventricles and sulci.     VISUALIZED ORBITS/SINUSES/MASTOIDS: No intraorbital abnormality. No paranasal sinus mucosal disease. No middle ear or mastoid effusion.    BONES/SOFT TISSUES: No acute abnormality. Left posterior scalp soft tissue contusion.      Impression    IMPRESSION:  1.  No acute intracranial process.   XR Chest 2 Views     Status:  None    Narrative    XR CHEST 2 VW  1/27/2020 11:15 PM      HISTORY: syncope and head injury    COMPARISON: None    FINDINGS: Critical is within normal limits. No pleural effusion or  pneumothorax. No focal airspace opacity.      Impression    IMPRESSION: Clear chest.    I have personally reviewed the examination and initial interpretation  and I agree with the findings.    LUIS TONY MD   POC US ECHO LIMITED     Status: None    Impression    Limited Bedside Cardiac Ultrasound, performed and interpreted by me.   Indication: syncope.  Parasternal long axis, parasternal short axis and apical 4 chamber views were acquired.   Image quality was satisfactory.    Findings:    Global left ventricular function appears intact.  Chambers do not appear dilated.  There is trace of free fluid within the pericardium.    IMPRESSION: Grossly normal left ventricular function and chamber size.  trace pericardial effusion..     CBC with platelets differential     Status: None   Result Value Ref Range    WBC 6.9 4.0 - 11.0 10e9/L    RBC Count 4.73 3.8 - 5.2 10e12/L    Hemoglobin 13.8 11.7 - 15.7 g/dL    Hematocrit 40.6 35.0 - 47.0 %    MCV 86 78 - 100 fl    MCH 29.2 26.5 - 33.0 pg    MCHC 34.0 31.5 - 36.5 g/dL    RDW 11.5 10.0 - 15.0 %    Platelet Count 239 150 - 450 10e9/L    Diff Method Automated Method     % Neutrophils 70.9 %    % Lymphocytes 20.6 %    % Monocytes 7.0 %    % Eosinophils 0.6 %    % Basophils 0.6 %    % Immature Granulocytes 0.3 %    Nucleated RBCs 0 0 /100    Absolute Neutrophil 4.9 1.6 - 8.3 10e9/L    Absolute Lymphocytes 1.4 0.8 - 5.3 10e9/L    Absolute Monocytes 0.5 0.0 - 1.3 10e9/L    Absolute Eosinophils 0.0 0.0 - 0.7 10e9/L    Absolute Basophils 0.0 0.0 - 0.2 10e9/L    Abs Immature Granulocytes 0.0 0 - 0.4 10e9/L    Absolute Nucleated RBC 0.0    INR     Status: None   Result Value Ref Range    INR 0.98 0.86 - 1.14   Comprehensive metabolic panel     Status: Abnormal   Result Value Ref Range    Sodium 138 133  - 144 mmol/L    Potassium 3.6 3.4 - 5.3 mmol/L    Chloride 106 94 - 109 mmol/L    Carbon Dioxide 24 20 - 32 mmol/L    Anion Gap 8 3 - 14 mmol/L    Glucose 118 (H) 70 - 99 mg/dL    Urea Nitrogen 11 7 - 30 mg/dL    Creatinine 0.62 0.52 - 1.04 mg/dL    GFR Estimate >90 >60 mL/min/[1.73_m2]    GFR Estimate If Black >90 >60 mL/min/[1.73_m2]    Calcium 8.9 8.5 - 10.1 mg/dL    Bilirubin Total 0.2 0.2 - 1.3 mg/dL    Albumin 3.7 3.4 - 5.0 g/dL    Protein Total 7.2 6.8 - 8.8 g/dL    Alkaline Phosphatase 76 40 - 150 U/L    ALT 42 0 - 50 U/L    AST 23 0 - 45 U/L   Magnesium     Status: None   Result Value Ref Range    Magnesium 2.2 1.6 - 2.3 mg/dL   Troponin I     Status: Abnormal   Result Value Ref Range    Troponin I ES 0.076 (H) 0.000 - 0.045 ug/L   TSH     Status: Abnormal   Result Value Ref Range    TSH 7.00 (H) 0.40 - 4.00 mU/L   UA with Microscopic reflex to Culture     Status: Abnormal   Result Value Ref Range    Color Urine Light Yellow     Appearance Urine Clear     Glucose Urine Negative NEG^Negative mg/dL    Bilirubin Urine Negative NEG^Negative    Ketones Urine Negative NEG^Negative mg/dL    Specific Gravity Urine 1.003 1.003 - 1.035    Blood Urine Negative NEG^Negative    pH Urine 6.0 5.0 - 7.0 pH    Protein Albumin Urine Negative NEG^Negative mg/dL    Urobilinogen mg/dL Normal 0.0 - 2.0 mg/dL    Nitrite Urine Negative NEG^Negative    Leukocyte Esterase Urine Negative NEG^Negative    Source Urine     WBC Urine 0 0 - 5 /HPF    RBC Urine 0 0 - 2 /HPF    Bacteria Urine Few (A) NEG^Negative /HPF   Troponin I     Status: Abnormal   Result Value Ref Range    Troponin I ES 0.158 (HH) 0.000 - 0.045 ug/L   Platelet count     Status: None   Result Value Ref Range    Platelet Count 221 150 - 450 10e9/L   Creatinine     Status: Abnormal   Result Value Ref Range    Creatinine 0.51 (L) 0.52 - 1.04 mg/dL    GFR Estimate >90 >60 mL/min/[1.73_m2]    GFR Estimate If Black >90 >60 mL/min/[1.73_m2]   T4 free     Status: None   Result  Value Ref Range    T4 Free 0.97 0.76 - 1.46 ng/dL   T3 Free     Status: None   Result Value Ref Range    Free T3 2.3 2.3 - 4.2 pg/mL   Troponin I     Status: Abnormal   Result Value Ref Range    Troponin I ES 0.135 (HH) 0.000 - 0.045 ug/L   EKG 12 lead     Status: None   Result Value Ref Range    Interpretation ECG Click View Image link to view waveform and result    Echo Complete     Status: None    Narrative    [id number]  [id number]  [id number]  [id number]^GERI^FABIENNE^HILARIO SANDOVAL           [facility],[place]  Echocardiography Laboratory  [address] [place] [address]     Name: BHAVYA GANN  MRN: [id number]  : 1965  Study Date: 2020 06:57 AM  Age: 54 yrs  Gender: Female  Patient Location: [facility]  Reason For Study: Syncope and Collapse  Ordering Physician: FABIENNE NEVAREZ  Performed By: Neelam Rain RDCS     BSA: 1.4 m2  Height: 55 in  Weight: 118 lb  HR: 65  BP: 114/68 mmHg  _____________________________________________________________________________  __        Procedure  Echocardiogram with two-dimensional, color and spectral Doppler performed.  Contrast Optison. Optison (NDC #5345-6236-40) given intravenously. Patient was  given 5 ml mixture of 3 ml Optison and 6 ml saline. 4 ml wasted.  _____________________________________________________________________________  __        Interpretation Summary  Global and regional left ventricular function is hyperkinetic with an EF of  65-70%.  Right ventricular function, chamber size, wall motion, and thickness are  normal.  No significant valvular dysfunction present.  The inferior vena cava was normal in size with preserved respiratory  variability.  No pericardial effusion is present.     There is no prior study for direct comparison.  _____________________________________________________________________________  __        Left Ventricle  Left ventricular size is normal. Left ventricular wall thickness is normal.  Global  and regional left ventricular function is hyperkinetic with an EF of  65-70%. Left ventricular diastolic function is normal.     Right Ventricle  Right ventricular function, chamber size, wall motion, and thickness are  normal.     Atria  Both atria appear normal.     Mitral Valve  The mitral valve is normal.        Aortic Valve  Aortic valve is normal in structure and function. The aortic valve is  tricuspid. No aortic regurgitation is present.     Tricuspid Valve  The tricuspid valve is normal. Trace tricuspid insufficiency is present. Right  ventricular systolic pressure is 23mmHg above the right atrial pressure.     Pulmonic Valve  The pulmonic valve is normal.     Vessels  The inferior vena cava was normal in size with preserved respiratory  variability. The aorta root is normal. Ascending aorta 2.7 cm.     Pericardium  No pericardial effusion is present.        Compared to Previous Study  There is no prior study for direct comparison.     Attestation  I have personally viewed the imaging and agree with the interpretation and  report as documented by the fellow, Karan Cobb, and/or edited by me.  _____________________________________________________________________________  __     MMode/2D Measurements & Calculations  IVSd: 0.76 cm  LVIDd: 4.1 cm  LVIDs: 2.8 cm  LVPWd: 0.84 cm  FS: 32.6 %  LV mass(C)d: 97.1 grams  LV mass(C)dI: 69.3 grams/m2  asc Aorta Diam: 2.7 cm  LVOT diam: 1.9 cm  LVOT area: 2.8 cm2  LA Volume (BP): 34.4 ml     LA Volume Index (BP): 24.6 ml/m2  RWT: 0.41        Doppler Measurements & Calculations  MV E max tariq: 77.1 cm/sec  MV A max tariq: 76.6 cm/sec  MV E/A: 1.0  MV dec slope: 342.0 cm/sec2  MV dec time: 0.22 sec  TR max tariq: 238.5 cm/sec  TR max P.9 mmHg  E/E' av.1  Lateral E/e': 11.6  Medial E/e': 10.6        _____________________________________________________________________________  __        Report approved by: Fernando Short 2020 08:48 AM      ABO/Rh type and  screen     Status: None   Result Value Ref Range    ABO B     RH(D) Pos     Antibody Screen Neg     Test Valid Only At          [facility]    Specimen Expires 01/30/2020          Assessments & Plan (with Medical Decision Making)  54-year-old female presents ER with syncope and collapse this evening.  No previous history of seizures cardiac disease etc.  Patient does have some depression otherwise.  No reports of ingestion etc.  Patient taken a bath with bath salts.  After that patient stood up was drying herself off for a period of time and was standing in the bathtub there was no water in the tub anymore.  Patient then found her self 20 minutes later in the time added the back of her head without laceration.  Had a headache.  No confusion no loss of bowel or bladder no other injuries present now here for evaluation.  She is evaluated head CT was negative EKG did not show any hyperacute changes labs otherwise stable except for an elevated troponin of 0.076.  TSH also 7.00 T4 was ordered pending.  Chest x-ray otherwise normal without changes noted.  Patient given liter fluid 324 mg of baby aspirin is been stable reassessed several times discussed with cardiology along with the fellow who plan admit to cardiology telemetry unit for ongoing management of syncope and collapse head injury with elevated troponin rule out cardiac causes.  Family agrees with plan and will be admitted           I have reviewed the nursing notes.    I have reviewed the findings, diagnosis, plan and need for follow up with the patient.  There are no discharge medications for this patient.    Final diagnoses:   Syncope and collapse   Injury of head, initial encounter   Elevated troponin   ISAAC, Baljinder Morse, am serving as a trained medical scribe to document services personally performed by Geoffrey Ramirez MD, based on the provider's statements to me.   I, Geoffrey Ramirez MD, was physically present  and have reviewed and verified the accuracy of this note documented by Baljinder Morse.     1/27/2020   [facility], [place], EMERGENCY DEPARTMENT     Geoffrey Ramirez MD  01/28/20 1146       Geoffrey Ramirez MD  01/28/20 [id number]

## 2020-01-31 LAB
A PHAGOCYTOPH DNA BLD QL NAA+PROBE: NOT DETECTED
E CHAFFEENSIS DNA BLD QL NAA+PROBE: NOT DETECTED
E EWINGII DNA SPEC QL NAA+PROBE: NOT DETECTED
EHRLICHIA DNA SPEC QL NAA+PROBE: NOT DETECTED

## 2020-02-10 ENCOUNTER — OFFICE VISIT - HEALTHEAST (OUTPATIENT)
Dept: FAMILY MEDICINE | Facility: CLINIC | Age: 55
End: 2020-02-10

## 2020-02-10 DIAGNOSIS — R55 SYNCOPE, UNSPECIFIED SYNCOPE TYPE: ICD-10-CM

## 2020-02-10 DIAGNOSIS — R51.9 CHRONIC NONINTRACTABLE HEADACHE, UNSPECIFIED HEADACHE TYPE: ICD-10-CM

## 2020-02-10 DIAGNOSIS — G89.29 CHRONIC NONINTRACTABLE HEADACHE, UNSPECIFIED HEADACHE TYPE: ICD-10-CM

## 2020-02-10 DIAGNOSIS — F43.10 PTSD (POST-TRAUMATIC STRESS DISORDER): ICD-10-CM

## 2020-02-10 DIAGNOSIS — F32.2 SEVERE MAJOR DEPRESSION (H): ICD-10-CM

## 2020-02-10 ASSESSMENT — MIFFLIN-ST. JEOR: SCORE: 977.37

## 2020-03-24 ENCOUNTER — OFFICE VISIT - HEALTHEAST (OUTPATIENT)
Dept: FAMILY MEDICINE | Facility: CLINIC | Age: 55
End: 2020-03-24

## 2020-03-24 DIAGNOSIS — G89.29 CHRONIC NONINTRACTABLE HEADACHE, UNSPECIFIED HEADACHE TYPE: ICD-10-CM

## 2020-03-24 DIAGNOSIS — R51.9 CHRONIC NONINTRACTABLE HEADACHE, UNSPECIFIED HEADACHE TYPE: ICD-10-CM

## 2020-03-24 DIAGNOSIS — F43.10 PTSD (POST-TRAUMATIC STRESS DISORDER): ICD-10-CM

## 2020-06-02 ENCOUNTER — COMMUNICATION - HEALTHEAST (OUTPATIENT)
Dept: SCHEDULING | Facility: CLINIC | Age: 55
End: 2020-06-02

## 2020-06-02 DIAGNOSIS — R26.81 UNSTEADY GAIT: ICD-10-CM

## 2020-06-17 ENCOUNTER — RECORDS - HEALTHEAST (OUTPATIENT)
Dept: ADMINISTRATIVE | Facility: OTHER | Age: 55
End: 2020-06-17

## 2020-06-30 ENCOUNTER — COMMUNICATION - HEALTHEAST (OUTPATIENT)
Dept: FAMILY MEDICINE | Facility: CLINIC | Age: 55
End: 2020-06-30

## 2020-06-30 DIAGNOSIS — R51.9 CHRONIC NONINTRACTABLE HEADACHE, UNSPECIFIED HEADACHE TYPE: ICD-10-CM

## 2020-06-30 DIAGNOSIS — G89.29 CHRONIC NONINTRACTABLE HEADACHE, UNSPECIFIED HEADACHE TYPE: ICD-10-CM

## 2020-07-14 ENCOUNTER — COMMUNICATION - HEALTHEAST (OUTPATIENT)
Dept: FAMILY MEDICINE | Facility: CLINIC | Age: 55
End: 2020-07-14

## 2020-07-14 ENCOUNTER — OFFICE VISIT - HEALTHEAST (OUTPATIENT)
Dept: FAMILY MEDICINE | Facility: CLINIC | Age: 55
End: 2020-07-14

## 2020-07-14 DIAGNOSIS — F43.10 PTSD (POST-TRAUMATIC STRESS DISORDER): ICD-10-CM

## 2020-07-14 DIAGNOSIS — G89.29 CHRONIC NONINTRACTABLE HEADACHE, UNSPECIFIED HEADACHE TYPE: ICD-10-CM

## 2020-07-14 DIAGNOSIS — E55.9 VITAMIN D DEFICIENCY: ICD-10-CM

## 2020-07-14 DIAGNOSIS — M79.10 MYALGIA: ICD-10-CM

## 2020-07-14 DIAGNOSIS — R51.9 CHRONIC NONINTRACTABLE HEADACHE, UNSPECIFIED HEADACHE TYPE: ICD-10-CM

## 2020-07-14 ASSESSMENT — PATIENT HEALTH QUESTIONNAIRE - PHQ9: SUM OF ALL RESPONSES TO PHQ QUESTIONS 1-9: 12

## 2020-07-14 ASSESSMENT — MIFFLIN-ST. JEOR: SCORE: 977.37

## 2020-07-17 ENCOUNTER — COMMUNICATION - HEALTHEAST (OUTPATIENT)
Dept: PALLIATIVE MEDICINE | Facility: OTHER | Age: 55
End: 2020-07-17

## 2020-08-20 ENCOUNTER — OFFICE VISIT - HEALTHEAST (OUTPATIENT)
Dept: FAMILY MEDICINE | Facility: CLINIC | Age: 55
End: 2020-08-20

## 2020-08-20 DIAGNOSIS — Z28.39 IMMUNIZATION DEFICIENCY: ICD-10-CM

## 2020-08-20 DIAGNOSIS — G89.29 CHRONIC NONINTRACTABLE HEADACHE, UNSPECIFIED HEADACHE TYPE: ICD-10-CM

## 2020-08-20 DIAGNOSIS — M79.7 FIBROMYALGIA: ICD-10-CM

## 2020-08-20 DIAGNOSIS — R51.9 CHRONIC NONINTRACTABLE HEADACHE, UNSPECIFIED HEADACHE TYPE: ICD-10-CM

## 2020-08-20 DIAGNOSIS — F32.2 SEVERE MAJOR DEPRESSION (H): ICD-10-CM

## 2020-08-20 DIAGNOSIS — M79.10 MYALGIA: ICD-10-CM

## 2020-08-20 ASSESSMENT — MIFFLIN-ST. JEOR: SCORE: 977.37

## 2020-09-04 ENCOUNTER — OFFICE VISIT - HEALTHEAST (OUTPATIENT)
Dept: PHYSICAL THERAPY | Facility: REHABILITATION | Age: 55
End: 2020-09-04

## 2020-09-04 DIAGNOSIS — R51.9 HEADACHE: ICD-10-CM

## 2020-09-04 DIAGNOSIS — R51.9 CHRONIC NONINTRACTABLE HEADACHE, UNSPECIFIED HEADACHE TYPE: ICD-10-CM

## 2020-09-04 DIAGNOSIS — R29.3 POOR POSTURE: ICD-10-CM

## 2020-09-04 DIAGNOSIS — G89.29 CHRONIC NONINTRACTABLE HEADACHE, UNSPECIFIED HEADACHE TYPE: ICD-10-CM

## 2020-09-10 ENCOUNTER — HOSPITAL ENCOUNTER (OUTPATIENT)
Dept: PALLIATIVE MEDICINE | Facility: OTHER | Age: 55
Discharge: HOME | End: 2020-09-10
Attending: FAMILY MEDICINE

## 2020-09-10 DIAGNOSIS — G89.4 CHRONIC PAIN SYNDROME: ICD-10-CM

## 2020-09-10 DIAGNOSIS — G89.29 CHRONIC NONINTRACTABLE HEADACHE, UNSPECIFIED HEADACHE TYPE: ICD-10-CM

## 2020-09-10 DIAGNOSIS — R51.9 CHRONIC NONINTRACTABLE HEADACHE, UNSPECIFIED HEADACHE TYPE: ICD-10-CM

## 2020-09-11 ENCOUNTER — OFFICE VISIT - HEALTHEAST (OUTPATIENT)
Dept: PHYSICAL THERAPY | Facility: REHABILITATION | Age: 55
End: 2020-09-11

## 2020-09-11 DIAGNOSIS — F43.10 PTSD (POST-TRAUMATIC STRESS DISORDER): ICD-10-CM

## 2020-09-11 DIAGNOSIS — G89.29 CHRONIC NONINTRACTABLE HEADACHE, UNSPECIFIED HEADACHE TYPE: ICD-10-CM

## 2020-09-11 DIAGNOSIS — F32.2 SEVERE MAJOR DEPRESSION (H): ICD-10-CM

## 2020-09-11 DIAGNOSIS — R51.9 CHRONIC NONINTRACTABLE HEADACHE, UNSPECIFIED HEADACHE TYPE: ICD-10-CM

## 2020-09-11 DIAGNOSIS — R29.3 POOR POSTURE: ICD-10-CM

## 2020-09-17 ENCOUNTER — HOSPITAL ENCOUNTER (OUTPATIENT)
Dept: PALLIATIVE MEDICINE | Facility: OTHER | Age: 55
Discharge: HOME | End: 2020-09-17

## 2020-09-17 ENCOUNTER — OFFICE VISIT - HEALTHEAST (OUTPATIENT)
Dept: PHYSICAL THERAPY | Facility: REHABILITATION | Age: 55
End: 2020-09-17

## 2020-09-17 DIAGNOSIS — R51.9 CHRONIC NONINTRACTABLE HEADACHE, UNSPECIFIED HEADACHE TYPE: ICD-10-CM

## 2020-09-17 DIAGNOSIS — R29.3 POOR POSTURE: ICD-10-CM

## 2020-09-17 DIAGNOSIS — G89.4 CHRONIC PAIN SYNDROME: ICD-10-CM

## 2020-09-17 DIAGNOSIS — F32.2 SEVERE MAJOR DEPRESSION (H): ICD-10-CM

## 2020-09-17 DIAGNOSIS — G89.29 CHRONIC NONINTRACTABLE HEADACHE, UNSPECIFIED HEADACHE TYPE: ICD-10-CM

## 2020-09-17 DIAGNOSIS — F43.10 PTSD (POST-TRAUMATIC STRESS DISORDER): ICD-10-CM

## 2020-09-22 ENCOUNTER — OFFICE VISIT - HEALTHEAST (OUTPATIENT)
Dept: PHYSICAL THERAPY | Facility: REHABILITATION | Age: 55
End: 2020-09-22

## 2020-09-22 DIAGNOSIS — F43.10 PTSD (POST-TRAUMATIC STRESS DISORDER): ICD-10-CM

## 2020-09-22 DIAGNOSIS — M79.7 FIBROMYALGIA: ICD-10-CM

## 2020-09-22 DIAGNOSIS — G89.29 CHRONIC NONINTRACTABLE HEADACHE, UNSPECIFIED HEADACHE TYPE: ICD-10-CM

## 2020-09-22 DIAGNOSIS — R51.9 CHRONIC NONINTRACTABLE HEADACHE, UNSPECIFIED HEADACHE TYPE: ICD-10-CM

## 2020-09-22 DIAGNOSIS — F32.2 SEVERE MAJOR DEPRESSION (H): ICD-10-CM

## 2020-09-22 DIAGNOSIS — R29.3 POOR POSTURE: ICD-10-CM

## 2020-09-24 ENCOUNTER — HOSPITAL ENCOUNTER (OUTPATIENT)
Dept: PALLIATIVE MEDICINE | Facility: OTHER | Age: 55
Discharge: HOME | End: 2020-09-24

## 2020-09-24 DIAGNOSIS — R51.9 CHRONIC NONINTRACTABLE HEADACHE, UNSPECIFIED HEADACHE TYPE: ICD-10-CM

## 2020-09-24 DIAGNOSIS — G89.29 CHRONIC NONINTRACTABLE HEADACHE, UNSPECIFIED HEADACHE TYPE: ICD-10-CM

## 2020-09-24 DIAGNOSIS — G89.4 CHRONIC PAIN SYNDROME: ICD-10-CM

## 2020-09-28 ENCOUNTER — HOSPITAL ENCOUNTER (OUTPATIENT)
Dept: PALLIATIVE MEDICINE | Facility: OTHER | Age: 55
Discharge: HOME | End: 2020-09-28
Attending: ACUPUNCTURIST

## 2020-09-28 DIAGNOSIS — G89.29 CHRONIC NONINTRACTABLE HEADACHE, UNSPECIFIED HEADACHE TYPE: ICD-10-CM

## 2020-09-28 DIAGNOSIS — M54.2 NECK PAIN: ICD-10-CM

## 2020-09-28 DIAGNOSIS — R51.9 CHRONIC NONINTRACTABLE HEADACHE, UNSPECIFIED HEADACHE TYPE: ICD-10-CM

## 2020-09-28 DIAGNOSIS — G89.4 CHRONIC PAIN SYNDROME: ICD-10-CM

## 2020-10-02 ENCOUNTER — OFFICE VISIT - HEALTHEAST (OUTPATIENT)
Dept: PHYSICAL THERAPY | Facility: REHABILITATION | Age: 55
End: 2020-10-02

## 2020-10-02 DIAGNOSIS — R29.3 POOR POSTURE: ICD-10-CM

## 2020-10-02 DIAGNOSIS — R51.9 CHRONIC NONINTRACTABLE HEADACHE, UNSPECIFIED HEADACHE TYPE: ICD-10-CM

## 2020-10-02 DIAGNOSIS — F32.2 SEVERE MAJOR DEPRESSION (H): ICD-10-CM

## 2020-10-02 DIAGNOSIS — G89.29 CHRONIC NONINTRACTABLE HEADACHE, UNSPECIFIED HEADACHE TYPE: ICD-10-CM

## 2020-10-02 DIAGNOSIS — F43.10 PTSD (POST-TRAUMATIC STRESS DISORDER): ICD-10-CM

## 2020-10-19 ENCOUNTER — HOSPITAL ENCOUNTER (OUTPATIENT)
Dept: PALLIATIVE MEDICINE | Facility: OTHER | Age: 55
Discharge: HOME | End: 2020-10-19
Attending: ACUPUNCTURIST

## 2020-10-19 DIAGNOSIS — G89.4 CHRONIC PAIN SYNDROME: ICD-10-CM

## 2020-10-19 DIAGNOSIS — M54.2 NECK PAIN: ICD-10-CM

## 2020-10-19 DIAGNOSIS — R51.9 CHRONIC NONINTRACTABLE HEADACHE, UNSPECIFIED HEADACHE TYPE: ICD-10-CM

## 2020-10-19 DIAGNOSIS — G89.29 CHRONIC LEFT SHOULDER PAIN: ICD-10-CM

## 2020-10-19 DIAGNOSIS — M25.512 CHRONIC LEFT SHOULDER PAIN: ICD-10-CM

## 2020-10-19 DIAGNOSIS — G89.29 CHRONIC NONINTRACTABLE HEADACHE, UNSPECIFIED HEADACHE TYPE: ICD-10-CM

## 2020-10-20 ENCOUNTER — COMMUNICATION - HEALTHEAST (OUTPATIENT)
Dept: PHYSICAL THERAPY | Facility: REHABILITATION | Age: 55
End: 2020-10-20

## 2020-10-28 ENCOUNTER — COMMUNICATION - HEALTHEAST (OUTPATIENT)
Dept: PHYSICAL THERAPY | Facility: REHABILITATION | Age: 55
End: 2020-10-28

## 2020-11-03 ENCOUNTER — OFFICE VISIT - HEALTHEAST (OUTPATIENT)
Dept: PHYSICAL THERAPY | Facility: REHABILITATION | Age: 55
End: 2020-11-03

## 2020-11-03 DIAGNOSIS — R29.3 POOR POSTURE: ICD-10-CM

## 2020-11-03 DIAGNOSIS — F32.2 SEVERE MAJOR DEPRESSION (H): ICD-10-CM

## 2020-11-03 DIAGNOSIS — R51.9 CHRONIC NONINTRACTABLE HEADACHE, UNSPECIFIED HEADACHE TYPE: ICD-10-CM

## 2020-11-03 DIAGNOSIS — F43.10 PTSD (POST-TRAUMATIC STRESS DISORDER): ICD-10-CM

## 2020-11-03 DIAGNOSIS — M79.7 FIBROMYALGIA: ICD-10-CM

## 2020-11-03 DIAGNOSIS — G89.29 CHRONIC NONINTRACTABLE HEADACHE, UNSPECIFIED HEADACHE TYPE: ICD-10-CM

## 2021-04-28 ENCOUNTER — COMMUNICATION - HEALTHEAST (OUTPATIENT)
Dept: FAMILY MEDICINE | Facility: CLINIC | Age: 56
End: 2021-04-28

## 2021-05-10 ENCOUNTER — RECORDS - HEALTHEAST (OUTPATIENT)
Dept: FAMILY MEDICINE | Facility: CLINIC | Age: 56
End: 2021-05-10

## 2021-05-10 DIAGNOSIS — Z12.31 ENCOUNTER FOR SCREENING MAMMOGRAM FOR BREAST CANCER: ICD-10-CM

## 2021-05-27 ASSESSMENT — PATIENT HEALTH QUESTIONNAIRE - PHQ9
SUM OF ALL RESPONSES TO PHQ QUESTIONS 1-9: 12
SUM OF ALL RESPONSES TO PHQ QUESTIONS 1-9: 25

## 2021-06-04 VITALS
RESPIRATION RATE: 20 BRPM | DIASTOLIC BLOOD PRESSURE: 64 MMHG | TEMPERATURE: 98.8 F | HEIGHT: 56 IN | WEIGHT: 118 LBS | OXYGEN SATURATION: 96 % | BODY MASS INDEX: 26.54 KG/M2 | SYSTOLIC BLOOD PRESSURE: 102 MMHG | HEART RATE: 77 BPM

## 2021-06-04 VITALS
WEIGHT: 118 LBS | RESPIRATION RATE: 16 BRPM | DIASTOLIC BLOOD PRESSURE: 66 MMHG | HEIGHT: 55 IN | OXYGEN SATURATION: 96 % | BODY MASS INDEX: 27.31 KG/M2 | SYSTOLIC BLOOD PRESSURE: 100 MMHG | HEART RATE: 76 BPM | TEMPERATURE: 98.2 F

## 2021-06-04 VITALS
BODY MASS INDEX: 27.31 KG/M2 | RESPIRATION RATE: 20 BRPM | HEART RATE: 60 BPM | WEIGHT: 118 LBS | TEMPERATURE: 98.1 F | DIASTOLIC BLOOD PRESSURE: 58 MMHG | SYSTOLIC BLOOD PRESSURE: 104 MMHG | HEIGHT: 55 IN | OXYGEN SATURATION: 97 %

## 2021-06-04 VITALS
TEMPERATURE: 98.4 F | HEART RATE: 63 BPM | DIASTOLIC BLOOD PRESSURE: 60 MMHG | RESPIRATION RATE: 20 BRPM | HEIGHT: 55 IN | SYSTOLIC BLOOD PRESSURE: 102 MMHG | OXYGEN SATURATION: 98 % | WEIGHT: 118 LBS | BODY MASS INDEX: 27.31 KG/M2

## 2021-06-05 NOTE — PROGRESS NOTES
ASSESMENT AND PLAN:  Diagnoses and all orders for this visit:    Severe major depression (H)  Counseling done today with the patient with the help of a professional .  I recommended that she continue follow-up with her psychologist and psychiatrist.  We are going to add a medication as prescribed below and I like to see her back for close follow-up here in the clinic in 2 weeks.  Extensive counseling with the patient with the help of a professional  on indications for ER follow-up for suicidality, she contracts for safety today.  Reviewed her recent blood test results which included negative thyroid testing.    PTSD (post-traumatic stress disorder)  Counseled the patient on the appropriate use of her lorazepam.  Recommended that she use it on an as-needed basis not a daily basis.  Will add prazosin as prescribed below in hopes of getting control of her nightmares and associated sleep disruption.  -     prazosin (MINIPRESS) 1 MG capsule; Take 1-2 capsules (1-2 mg total) by mouth at bedtime.  Dispense: 60 capsule; Refill: 6    Chronic nonintractable headache, unspecified headache type  -     Lyme Antibody [place]  -     Ehrlichia and Anaplasma Species by Real-Time PCR    Paresthesias  -     Vitamin D, Total (25-Hydroxy)    Need for hepatitis B screening test  -     Hepatitis B Surface Antigen (HBsAG)    Need for hepatitis C screening test  -     Hepatitis C Antibody (Anti-HCV)    Encounter for screening for HIV  -     HIV Antigen/Antibody Screening [place]    Other orders  -     Cancel: Mammo Screening Bilateral; Future; Expected date: 01/27/2020  -     Cancel: Ambulatory referral for Colonoscopy  Given the complexity and severity of her current mental illness, we did not have time in today's visit to address her health maintenance needs, this will be done in the future once her mental health has stabilized.        Reviewed the risks and benefits of the treatment plan with the patient and/or  "caregiver and we discussed indications for routine and emergent follow-up.        SUBJECTIVE: 54-year-old female, new patient, reports that she would like to switch to our clinic from her previous clinic at [facility].  Patient reports that she also sees a psychiatrist and psychologist for major depression.  She reports worsening depressed mood over the last 4 months.  During that time, she is had minimal ability to enjoy things in her daily life, persistently depressed mood which has been worsening, difficulty falling asleep, frequent nightmares and \"startling\" at night, often when awakening from a disturbing dream.  Patient reports that many of her dreams are related to difficult things that she went through in the past.  She is a refugee from [facility], having come to [place] in the [place]s.  Patient reports that these worsening symptoms have caused her to isolate herself and be reluctant to go out and do things in the community.  She currently lives with her son.    Patient also has concerns about ongoing intermittent issues with headaches, burning and tingling sensation in her body.  She has been in the emergency room multiple times.  Had recent negative head CT.  Patient reports a burning tingling sensation in her neck and arm on the left side.  She sometimes feels it in other parts of her body.  Headaches have been going on on and off for many months if not years.  She carries a past diagnosis of migraine headache.  Patient reports that she has not been having any headaches this last week.    Review of systems: No exertional chest pain, no fever, no vomiting, remainder of review of systems is as above or negative.    No past medical history on file.  Patient Active Problem List   Diagnosis     Severe major depression (H)     Chronic nonintractable headache, unspecified headache type     Current Outpatient Medications   Medication Sig Dispense Refill     acetaminophen (TYLENOL) 325 MG tablet Take 650 mg by " "mouth every 6 (six) hours as needed.       b complex vitamins tablet Take 1 tablet by mouth daily.       ergocalciferol (ERGOCALCIFEROL) 1,250 mcg (50,000 unit) capsule Take 1 capsule by mouth once a week.       LORazepam (ATIVAN) 0.5 MG tablet Take 0.25 mg by mouth 2 (two) times a day.       mirtazapine (REMERON) 30 MG tablet Take 15 mg by mouth at bedtime.       sertraline (ZOLOFT) 100 MG tablet Take 100 mg by mouth Daily at 8:00 am..       DULoxetine (CYMBALTA) 20 MG capsule Take 1 capsule (20 mg total) by mouth daily. 30 capsule 0     prazosin (MINIPRESS) 1 MG capsule Take 1-2 capsules (1-2 mg total) by mouth at bedtime. 60 capsule 6     No current facility-administered medications for this visit.      Social History     Tobacco Use   Smoking Status Never Smoker   Smokeless Tobacco Never Used       OBJECTICE: /64 (Patient Site: Left Arm)   Pulse 77   Temp 98.8  F (37.1  C) (Oral)   Resp 20   Ht 4' 8\" (1.422 m)   Wt 118 lb (53.5 kg)   SpO2 96%   BMI 26.46 kg/m       No results found for this or any previous visit (from the past 24 hour(s)).     GEN-alert, in no acute distress   HEENT-oropharynx is clear, neck is supple without palpable mass or lymphadenopathy.   CV-regular rate and rhythm with no murmur   RESP-lungs clear to auscultation   ABDOMINAL-soft and nontender to palpation   Neurologic- cranial nerves II through XII are intact, strength and sensation are symmetric.     Psychiatric- appearance is well-groomed, mood is very depressed, affect is withdrawn and flat, she often lays leaning against the edge of the chair with her eyes closed while she talks.  Speech of normal fluency and rate.  Thought content positive for some passive suicidal thinking but no intent or planning.  No homicidal ideation.  Thought processing negative for paranoid or delusional thinking.   SKIN-no ulcers or vesicles.      Rober Tavarez"

## 2021-06-06 NOTE — PROGRESS NOTES
ASSESMENT AND PLAN:  Diagnoses and all orders for this visit:    Syncope, unspecified syncope type  Likely a medication reaction to prazosin.  I have added this to her allergy list given the severity of the reaction.  Counseling done today with the patient and she has already discontinued the medication.  We are notifying the pharmacy to cancel further refills.    PTSD (post-traumatic stress disorder), anxiety, Severe major depression (H)  Counseling done today with the patient with the above a professional .  Counseled the patient that I am going to leave the medication management to 1 clinician at her psychiatry clinic so that there is not confusion about what medication she should be taking.  They recently prescribed gabapentin by her a psychiatrist has been added to her medication list as detailed below.  She will follow-up with her psychiatry clinic as directed and with me here again in the clinic in about 6 weeks.  -     gabapentin (NEURONTIN) 100 MG capsule; Take 100-300 mg by mouth 3 (three) times a day as needed.  Dispense: 90 capsule; Refill: 3      Chronic headache, unspecified headache type  Stable.  Hopefully she will get some improvement in her headache symptoms with the gabapentin that was recently added as detailed above.  -     gabapentin (NEURONTIN) 100 MG capsule; Take 100-300 mg by mouth 3 (three) times a day as needed.  Dispense: 90 capsule; Refill: 3      Reviewed the risks and benefits of the treatment plan with the patient and/or caregiver and we discussed indications for routine and emergent follow-up.        SUBJECTIVE: 54-year-old female here for follow-up.  Since I saw her last, she was admitted for an episode of syncope.  The syncope occurred after she took a dose of the prazosin that I had prescribed for her for PTSD.  Patient had extensive work-up, she did have an elevated troponin, but this was thought to be secondary to some demand ischemia and not any actual coronary  "artery disease.  Since discharge from the hospital, she has not had any chest pain or shortness of breath and has not had any recurrence of syncope or near syncope.  She does continue to struggle with headaches and with insomnia.  She also continues to struggle with anxiety and PTSD and depression.  She is seeing a psychiatrist and gabapentin was just recently added by her psychiatrist.  Patient reports that her headache pattern has not changed.  Earlier in the year the patient had been having some suicidal ideation, she had a psychiatric hospitalization, she reports that the suicidal ideation has resolved.    No past medical history on file.  Patient Active Problem List   Diagnosis     Severe major depression (H)     Chronic nonintractable headache, unspecified headache type     PTSD (post-traumatic stress disorder)     Current Outpatient Medications   Medication Sig Dispense Refill     acetaminophen (TYLENOL) 325 MG tablet Take 650 mg by mouth every 6 (six) hours as needed.       b complex vitamins tablet Take 1 tablet by mouth daily.       DULoxetine (CYMBALTA) 20 MG capsule Take 1 capsule (20 mg total) by mouth daily. 30 capsule 0     ergocalciferol (ERGOCALCIFEROL) 1,250 mcg (50,000 unit) capsule Take 1 capsule by mouth once a week.       LORazepam (ATIVAN) 0.5 MG tablet Take 0.25 mg by mouth 2 (two) times a day.       mirtazapine (REMERON) 30 MG tablet Take 15 mg by mouth at bedtime.       sertraline (ZOLOFT) 100 MG tablet Take 100 mg by mouth Daily at 8:00 am..       gabapentin (NEURONTIN) 100 MG capsule Take 100-300 mg by mouth 3 (three) times a day as needed. 90 capsule 3     No current facility-administered medications for this visit.      Social History     Tobacco Use   Smoking Status Never Smoker   Smokeless Tobacco Never Used       OBJECTICE: /66 (Patient Site: Right Arm)   Pulse 76   Temp 98.2  F (36.8  C) (Oral)   Resp 16   Ht 4' 7\" (1.397 m)   Wt 118 lb (53.5 kg)   SpO2 96%   " Breastfeeding No   BMI 27.43 kg/m       No results found for this or any previous visit (from the past 24 hour(s)).     GEN-alert, appropriate, in no apparent distress   Neurologic-cranial nerves II through XII are intact, strength and sensation are symmetric, gait is normal   CV-regular rate and rhythm with no murmur   RESP-lungs clear to auscultation   ABDOMINAL-soft and nontender   EXTREM-warm, no ankle edema, no calf tenderness   Psychiatric-appearance well-groomed, speech of normal fluency and rate, mood depressed, affect flat, thought content negative for suicidal or homicidal ideation, thought processing negative for paranoid or delusional thinking.      Rober Tavarez

## 2021-06-07 NOTE — PROGRESS NOTES
ASSESMENT AND PLAN:  Diagnoses and all orders for this visit:    PTSD (post-traumatic stress disorder)  Stable.  Patient was counseled on the importance of continued follow-up with her psychiatry clinic.  Counseling done today with the patient with the help of a professional .    Chronic nonintractable headache, unspecified headache type  Stable.  Apparently she is no longer on gabapentin.  However, impossible to do a complete medication reconciliation and review because our attempts of her spelling the names off of the bottles with her limited English skills and with the telephone  was unsuccessful in identifying all of the medications that she is currently taking.  She was counseled on the importance of bringing all of her medication bottles in to her next in person visit so we can make sure that her list here is up-to-date with what has been prescribed at the psychiatrist.  We also reviewed the letter as detailed below and she is going to bring that in to her next visit as well.      Reviewed the risks and benefits of the treatment plan with the patient and/or caregiver and we discussed indications for routine and emergent follow-up.  Total telephone time with the patient of 15 minutes.        SUBJECTIVE: 54-year-old female who is having a telephone visit with a telephone  and myself today in place of her in clinic visit because of the ongoing concerns about the coronavirus pandemic.  She is taking precautions with social distancing.  She does not have any new concerns or issues.  She reports that her mood has been stable, her sleep has been good most nights, she does report that her psychiatrist did make a medication change but she is not sure of the details.  The best that I can figure out with her today is that the gabapentin was discontinued and it was replaced with another unclear medication.  She reports that she has not had any new issues or problems since the medication  was changed.  She reports that her headaches have been about the same as baseline.  It does not sound like she had exacerbation of her headaches with discontinuation of the gabapentin.  Patient received a letter, she is wondering if it is from our clinic, she spells out some of the words that are listed on the letter and 1 of them is fibromyalgia.  I counseled her that there is no record of a letter going out to her from our clinic and that it could be from her psychiatry clinic or from a chiropractor or something like that, I told her not to take any action on the letter for now and that she will bring it into her next in person appointment here in the clinic.    No past medical history on file.  Patient Active Problem List   Diagnosis     Severe major depression (H)     Chronic nonintractable headache, unspecified headache type     PTSD (post-traumatic stress disorder)     Current Outpatient Medications   Medication Sig Dispense Refill     acetaminophen (TYLENOL) 325 MG tablet Take 650 mg by mouth every 6 (six) hours as needed.       b complex vitamins tablet Take 1 tablet by mouth daily.       DULoxetine (CYMBALTA) 20 MG capsule Take 1 capsule (20 mg total) by mouth daily. 30 capsule 0     ergocalciferol (ERGOCALCIFEROL) 1,250 mcg (50,000 unit) capsule Take 1 capsule by mouth once a week.       gabapentin (NEURONTIN) 100 MG capsule Take 100-300 mg by mouth 3 (three) times a day as needed. 90 capsule 3     LORazepam (ATIVAN) 0.5 MG tablet Take 0.25 mg by mouth 2 (two) times a day.       mirtazapine (REMERON) 30 MG tablet Take 15 mg by mouth at bedtime.       sertraline (ZOLOFT) 100 MG tablet Take 100 mg by mouth Daily at 8:00 am..       No current facility-administered medications for this visit.      Social History     Tobacco Use   Smoking Status Never Smoker   Smokeless Tobacco Never Used       OBJECTICE: There were no vitals taken for this visit.     No results found for this or any previous visit (from the  past 24 hour(s)).        Rober Tavarez

## 2021-06-08 NOTE — TELEPHONE ENCOUNTER
Under other orders, please write for a single point cane so CMT can print script off to fax to [facility].  See below message. Thanks.

## 2021-06-08 NOTE — TELEPHONE ENCOUNTER
"Wt 120    Ht 4'7\"    Patient is requesting the walker and cane   Home Nurse said insurance will cover both walker and cane. Wants due to feeling tired some days and would like a  to help her to get around the house and go to the store.    No dizziness not unsteady just tired.        "

## 2021-06-08 NOTE — TELEPHONE ENCOUNTER
Patient Returning Call  Reason for call:  Joan   Information relayed to patient:  Informed of message listed below.  Joan states [facility] did not have the equipment.  States the patient would only want the cane as the insurance will only pay for one device.  Please fax order to [facility] for a cane only at # [phone] and call Joan when order is faxed.  Patient has additional questions:  Yes  If YES, what are your questions/concerns:  As above.  Okay to leave a detailed message?: Yes

## 2021-06-08 NOTE — TELEPHONE ENCOUNTER
Orders being requested: Alejandra   Reason service is needed/diagnosis: recent fall back in 2019, with injury on the foot.   When are orders needed by: as soon as possible   Where to send Orders: [facility]  Okay to leave detailed message?  Yes  [phone]

## 2021-06-08 NOTE — TELEPHONE ENCOUNTER
Please fax the following order: Single-point cane and wheeled walker.  Diagnosis-foot injury.  Electronically signed by Rober Tavarez MD

## 2021-06-09 NOTE — TELEPHONE ENCOUNTER
Arms worker calls on behalf of patient, asking about acupuncture paperwork that the patient has yet to receive in the mail and also wondering about scheduling this apt.

## 2021-06-09 NOTE — TELEPHONE ENCOUNTER
Referral Request  Type of referral: Acupuncture   Who s requesting: Dora Franco  Why the request: Chronic pain  Have you been seen for this request: Yes  Does patient have a preference on a group/provider? Anywhere in [facility] that is in patient's network.  Okay to leave a detailed message?  Yes   [phone]

## 2021-06-09 NOTE — PROGRESS NOTES
ASSESMENT AND PLAN:  Diagnoses and all orders for this visit:    PTSD (post-traumatic stress disorder)  Counseling done today with the patient with the help of a professional .  She will continue to follow-up with her psychiatrist.    Chronic nonintractable headache, unspecified headache type  Counseled on options, reviewed her past medication history.  We are going to try a new medication as prescribed below.  She will follow-up with me in 1 month for recheck.  -     celecoxib (CELEBREX) 200 MG capsule; Take 1 capsule (200 mg total) by mouth daily as needed for pain.  Dispense: 30 capsule; Refill: 6    Myalgia  -     celecoxib (CELEBREX) 200 MG capsule; Take 1 capsule (200 mg total) by mouth daily as needed for pain.  Dispense: 30 capsule; Refill: 6    Vitamin D deficiency  Consult on the importance of continuing daily replacement therapy.  -     cholecalciferol, vitamin D3, 50 mcg (2,000 unit) Tab; Take 1 tablet (2,000 Units total) by mouth daily.  Dispense: 90 tablet; Refill: 3          Reviewed the risks and benefits of the treatment plan with the patient and/or caregiver and we discussed indications for routine and emergent follow-up.        SUBJECTIVE: 54-year-old female has chronic depression and PTSD, she is followed regularly by a psychiatrist.  She brings in her medications today and her psychiatric medication plan was updated in our chart, her psychiatrist is out of system but the information was obtained from the medication bottles that he prescribes.  Patient reports that she is seeing him once monthly, to switch to telephone visits with the pandemic.  She continues to deal with daily headaches, these are chronic, have been looked into and treated in the past-see previous notes for details.  Had negative CT scan of the head back in November 2019.  She reports no change in her headache pattern, she is still getting daily headaches.  No new associated symptoms.  She also continues to have  "chronic myalgias and diffuse body pain.  This also has not changed.  Patient reports that she did not find the gabapentin helpful in the past.  She does have a past history of vitamin D deficiency and has not been taking her vitamin D recently.    No past medical history on file.  Patient Active Problem List   Diagnosis     Severe major depression (H)     Chronic nonintractable headache, unspecified headache type     PTSD (post-traumatic stress disorder)     Myalgia     Current Outpatient Medications   Medication Sig Dispense Refill     acetaminophen (TYLENOL) 325 MG tablet Take 650 mg by mouth every 6 (six) hours as needed.       LORazepam (ATIVAN) 0.5 MG tablet Take 0.25 mg by mouth 2 (two) times a day.       mirtazapine (REMERON) 30 MG tablet Take 15 mg by mouth at bedtime.       venlafaxine (EFFEXOR-XR) 75 MG 24 hr capsule Take 1 capsule by mouth Daily at 8:00 am..       b complex vitamins tablet Take 1 tablet by mouth daily.       celecoxib (CELEBREX) 200 MG capsule Take 1 capsule (200 mg total) by mouth daily as needed for pain. 30 capsule 6     cholecalciferol, vitamin D3, 50 mcg (2,000 unit) Tab Take 1 tablet (2,000 Units total) by mouth daily. 90 tablet 3     No current facility-administered medications for this visit.      Social History     Tobacco Use   Smoking Status Never Smoker   Smokeless Tobacco Never Used       OBJECTICE: /60 (Patient Site: Left Arm)   Pulse 63   Temp 98.4  F (36.9  C) (Oral)   Resp 20   Ht 4' 7\" (1.397 m)   Wt 118 lb (53.5 kg)   SpO2 98%   BMI 27.43 kg/m       No results found for this or any previous visit (from the past 24 hour(s)).     GEN-alert, appropriate, in no acute distress   Neurologic-cranial nerves II through XII are intact, strength and sensation are intact and symmetric.   CV-regular rate and rhythm with no murmur   RESP-lungs clear to auscultation   Musculoskeletal-no pain with palpation of the large muscles.  No pain with palpation of the " temples.   EXTREM-warm with no edema   SKIN-no ulcers or vesicles   Psychiatric-appearance is well-groomed, speech of normal fluency and rate, mood is depressed, affect is flat, thought content negative for suicidal or homicidal ideation, thought processing negative for paranoid or delusional thinking.      Rober Tavarez

## 2021-06-10 NOTE — PROGRESS NOTES
ASSESMENT AND PLAN:  Diagnoses and all orders for this visit:    Severe major depression (H)  I think that this is a major contributor to her physical symptoms.  She is on aggressive medication therapy through her psychiatrist, will continue follow-up with her psychiatrist.    Chronic nonintractable headache, unspecified headache type  Discontinue Celebrex-ineffective.    Please see previous notes for details, she has had negative imaging in the past, has had negative lab work-up.  Possibly a component of somatization along with a musculoskeletal component.  -     Ambulatory referral to Adult PT- Internal    Myalgia - possible Fibromyalgia  She does not have classic trigger points.  Previous work-up has been extensive and negative.  -     Ambulatory referral to Adult PT- Internal    Immunization deficiency/health maintenance  Patient declines immunization update today.  She is behind on many health maintenance indicators and I did strongly encourage her to come in for a full health maintenance visit either with me or with a female partner if she is more comfortable with that.      Reviewed the risks and benefits of the treatment plan with the patient and/or caregiver and we discussed indications for routine and emergent follow-up.        SUBJECTIVE: 54-year-old female did not notice any improvement in her headaches or body pain with the addition of Celebrex.  Patient did not notice any improvement in her frequency or intensity of her headaches or her of her body and muscle aches.  It also gave her a sour taste in her mouth to take the medication which she took daily for about a week and then self discontinued.  Patient struggles with severe major depression and is cared for by psychiatry team.  She has been taking those medications.  She has a  that she works with that had indicated to her that it might be a good option to try physical therapy for her physical symptoms and she is willing to consider  "this.  The  had also wondered whether she may have a diagnosis of fibromyalgia.    No past medical history on file.  Patient Active Problem List   Diagnosis     Severe major depression (H)     Chronic nonintractable headache, unspecified headache type     PTSD (post-traumatic stress disorder)     Myalgia     possible Fibromyalgia     Current Outpatient Medications   Medication Sig Dispense Refill     acetaminophen (TYLENOL) 325 MG tablet Take 650 mg by mouth every 6 (six) hours as needed.       celecoxib (CELEBREX) 200 MG capsule Take 1 capsule (200 mg total) by mouth daily as needed for pain. 30 capsule 6     mirtazapine (REMERON) 30 MG tablet Take 15 mg by mouth at bedtime.       TRINTELLIX 10 mg Tab tablet        venlafaxine (EFFEXOR-XR) 75 MG 24 hr capsule Take 1 capsule by mouth Daily at 8:00 am..       b complex vitamins tablet Take 1 tablet by mouth daily.        cholecalciferol, vitamin D3, 50 mcg (2,000 unit) Tab Take 1 tablet (2,000 Units total) by mouth daily. (Patient not taking: Reported on 8/20/2020) 90 tablet 3     ergocalciferol (ERGOCALCIFEROL) 1,250 mcg (50,000 unit) capsule Take 50,000 Units by mouth.       ibuprofen (ADVIL,MOTRIN) 600 MG tablet Take 600 mg by mouth.       LORazepam (ATIVAN) 0.5 MG tablet Take 0.25 mg by mouth 2 (two) times a day.        prochlorperazine (COMPAZINE) 10 MG tablet        sertraline (ZOLOFT) 100 MG tablet Take 100 mg by mouth.       No current facility-administered medications for this visit.      Social History     Tobacco Use   Smoking Status Never Smoker   Smokeless Tobacco Never Used       OBJECTICE: /58   Pulse 60   Temp 98.1  F (36.7  C) (Oral)   Resp 20   Ht 4' 7\" (1.397 m)   Wt 118 lb (53.5 kg)   SpO2 97%   BMI 27.43 kg/m       No results found for this or any previous visit (from the past 24 hour(s)).     GEN-alert, in no acute distress   Neurologic-cranial nerves II through XII are intact, strength and sensation are symmetric.  "  CV-regular rate and rhythm with no murmur   RESP-lungs clear to auscultation   Musculoskeletal-she does not have tenderness to palpation over the usual trigger points in the neck and back.   EXTREM-warm with no edema   SKIN-no ulcers or vesicles overlying her areas of pain   Psychiatric-appearance is well-groomed, speech is somewhat slow, mood is depressed, affect is flat, thought content negative for suicidal intention or planning and negative for homicidal ideation.  Thought processing negative for paranoid or delusional thinking.      Rober Tavarez

## 2021-06-11 NOTE — PROGRESS NOTES
[facility] Daily Progress Note    Patient Name: Mehul Ambriz  Date: 2020  Visit #: 4  PTA visit #:  NA  Referral Diagnosis: Chronic nonintractable headache, unspecified headache type  Referring provider: Rober Tavarez MD  Visit Diagnosis:     ICD-10-CM    1. Chronic nonintractable headache, unspecified headache type  R51    2. Poor posture  R29.3      Assessment from Initial Evaluation:  Mehul Ambriz is a 54 y.o. female who presents to therapy today with chief complaints of chronic headaches. Symptoms began about a year ago without known injury.  Difficulty with house chores due to pain and numbness.  Pain symptoms are intermittent and not improving.  Patient demonstrates signs and sx consistent with poor posture. PT POC and goals have been discussed with patient and She  is agreeable to these. Patient appears motivated for physical therapy and is appropriate for skilled therapy services.    Precautions / Restrictions : none per chart    Assessment:     HEP/POC compliance is  good .  The patient demonstrates continued improvements in her HA and neck pain symptoms.  Her L shoulder is now bothering her, which started 2 days ago.  She was able to tolerate new exercises well today and is appropriate to continue with skilled PT services at this time.    Goal Status:  Pt. will demonstrate/verbalize independence in self-management of condition in : 12 weeks  Pt. will be independent with home exercise program in : 6 weeks    Pt will: be able to perform household chores with less headaches within 8 weeks in order to improve her QOL.  Pt will: be have less than 2 headaches each week within 8 weeks in order to improve her QOL.    Plan / Patient Education:     Continue with initial plan of care.  Progress with home program as tolerated.  Continue with joint mobilizations if helpful.  Progress scapular/postural strengthening as able.  Shoulder strengthening PRN.    Subjective:     Pain Ratin in  shoulder, 5/10 in neck/HA  The patient reports that she is noticing some changes.  Her L shoulder has started bothering her.  Her HA seem better.  The last HA she had was 4 days ago.  The intensity of her HA is also better.    Objective:     Hypomobility C2-C3.  Decreased shoulder ROM L into flexion and abduction.  Tender supraspinatus mm tendon anterior to subacromial space.    Exercise #1: chin tucks HEP  Comment #1: scap retraction HEP  Exercise #2: doorway pec stretch HEP  Comment #2: Upper cervical rotation self-mob HEP  Exercise #3: UT stretch HEP  Comment #3: Levator stretch HEP  Exercise #4: Shoulder AAROM: flexion x 5  Comment #4: Shoulder ER with L1 band x 15    Appt time: 7:47AM - 8:12AM    Treatment Today     TREATMENT MINUTES COMMENTS   Evaluation     Self-care/ Home management     Manual therapy 12 -Prone PA mobilizations grade III x 30 x 3 to C2-C4  -Prone unilateral PA mobilizations grade III x 30 x 3 to C2/3 and C3/4 facet joints bilaterally   Neuromuscular Re-education     Therapeutic Activity     Therapeutic Exercises 13 -Subjective measures taken  -See flow sheet; added shoulder ER with band and shoulder flexion AAROM with wand to HEP for shoulder strengthening and ROM   Gait training     Modality__________________                Total 25    Blank areas are intentional and mean the treatment did not include these items.       Joanna Gates, PT, DPT  9/22/2020

## 2021-06-11 NOTE — PROGRESS NOTES
[facility] Daily Progress Note    Patient Name: Mehul Ambriz  Date: 2020  Visit #: 2  PTA visit #:  NA  Referral Diagnosis: Chronic nonintractable headache, unspecified headache type  Referring provider: Rober Tavarez MD  Visit Diagnosis:     ICD-10-CM    1. Chronic nonintractable headache, unspecified headache type  R51    2. Poor posture  R29.3      Assessment from Initial Evaluation:  Mehul Ambriz is a 54 y.o. female who presents to therapy today with chief complaints of chronic headaches. Symptoms began about a year ago without known injury.  Difficulty with house chores due to pain and numbness.  Pain symptoms are intermittent and not improving.  Patient demonstrates signs and sx consistent with poor posture. PT POC and goals have been discussed with patient and She  is agreeable to these. Patient appears motivated for physical therapy and is appropriate for skilled therapy services.    Precautions / Restrictions : none per chart    Assessment:     HEP/POC compliance is  good .  The patient was able to tolerate treatment well today.  She noted improvements in her pain symptoms afterwards.  She has a good understanding of her HEP and is appropriate to continue with skilled PT services at this time.    Goal Status:  Pt. will demonstrate/verbalize independence in self-management of condition in : 12 weeks  Pt. will be independent with home exercise program in : 6 weeks    Pt will: be able to perform household chores with less headaches within 8 weeks in order to improve her QOL.  Pt will: be have less than 2 headaches each week within 8 weeks in order to improve her QOL.    Plan / Patient Education:     Continue with initial plan of care.  Progress with home program as tolerated.  Continue with joint mobilizations if helpful.  Progress scapular/postural strengthening as able.    Subjective:     Pain Ratin  The patient reports no changes since last session.  She is getting headaches  a couple times per week.  She is doing her exercises daily.    Objective:     Cervical ROM is WNL without pain.    Exercise #1: chin tucks x10; cues for correct technique  Comment #1: scap retraction x10; cues for correct technique  Exercise #2: doorway pec stretch x 30 seconds; cues to lower hands on door frame  Comment #2: Upper cervical rotation self-mob x 3 bilaterally    Appt time: 9:18AM - 9:44AM    Treatment Today     TREATMENT MINUTES COMMENTS   Evaluation     Self-care/ Home management     Manual therapy 12 -Prone PA mobilizations grade III x 30 x 3 to C2-C4  -Prone unilateral PA mobilizations grade III x 30 x 3 to C2/3 and C3/4 facet joints bilaterally   Neuromuscular Re-education     Therapeutic Activity     Therapeutic Exercises 14 -Subjective measures taken  -See flow sheet; review of HEP   Gait training     Modality__________________                Total 26    Blank areas are intentional and mean the treatment did not include these items.       Joanna Gates, PT, DPT  9/11/2020

## 2021-06-11 NOTE — PROGRESS NOTES
"ACUPUNCTURIST TREATMENT NOTE    Name: Mehul Ambriz  :  1965  MRN:  [id number]      Acupuncture Treatment  Patient Type: JN Pain  Intervention Reason: Pain;Wellness  Pre-session Pain Ratin  Post-session Pain Ratin  Patient complaint:: Patient is seen for first treatment today with iPad . She would like to be treated for chronic pain in head and upper body. Patient tells me she fell down some stairs in 2019 and twisted her ankle. It was after this fall she began to experience the pain in her upper body. She denies hitting her head or upper body anywhere during the fall. She does have history of migraine headaches, and previous CT from 2019 without any concerns. Patient describes pain in parietal/temporal area of head, neck, upper back and arms into hands. Pain is described as burning, tingling and sharp. Pain is constant, but intensity goes up and down. Generally her pain is worse on her left side. She will also feel dizzy when her pain increases, as well as have ringing in her ears. She feels better when she is moving around and walking, and also better in the heat and sunshine. Her pain worsens in the cold weather. She does not have any swelling in any of her joints. She is able to fall asleep at night, but wakes several times due to pain. Patient generally feels cold, although will occasionally have night sweats due to anxiety or bad dreams. She notes she has bad dreams often and high anxiety as well as depression. She reports her digestion is \"ok.\" Has chronic canker sores in mouth and on tongue.  Acupuncture (Points):: Baihui, [place], Gb8, Gb12, Gb20, Ub10, Gb21, Li4, Si3, Pc6, Ht7, Gb34, Sp6, Ki3, Ub62, Liv3, Gb41  TCM Pulse: weak, deep, weaker/deeper on left side  TCM Diagnosis: Cold Damp Bi Syndrome, Heart Fire, Kidney Hinson Deficiency  Practitioner Observed: 30 minute treatment. Infrared heat lamp over feet. Tuina with posumon to neck/shoulders.  Treatment " "Plan/Follow up: 5 more weekly visits, then re-evaluate.  Checklist: Progress Note Completed;Consent Reveiwed  Follow up comments: Patient relaxed well during treatment.    \"Risks and benefits of acupuncture were discussed with patient. Consent for treatment was given. We thank you for the referral.\"     Neyda Raman L.Ac.    Date:  9/10/2020  Time:  10:24 AM    "

## 2021-06-11 NOTE — PROGRESS NOTES
[facility] Daily Progress Note    Patient Name: Mehul Ambriz  Date: 2020  Visit #: 3  PTA visit #:  NA  Referral Diagnosis: Chronic nonintractable headache, unspecified headache type  Referring provider: Rober Tavarez MD  Visit Diagnosis:     ICD-10-CM    1. Chronic nonintractable headache, unspecified headache type  R51    2. Poor posture  R29.3      Assessment from Initial Evaluation:  Mehul Ambriz is a 54 y.o. female who presents to therapy today with chief complaints of chronic headaches. Symptoms began about a year ago without known injury.  Difficulty with house chores due to pain and numbness.  Pain symptoms are intermittent and not improving.  Patient demonstrates signs and sx consistent with poor posture. PT POC and goals have been discussed with patient and She  is agreeable to these. Patient appears motivated for physical therapy and is appropriate for skilled therapy services.    Precautions / Restrictions : none per chart    Assessment:     HEP/POC compliance is  good .  The patient demonstrates overall improvements in her HA symptoms.  She continues to have pain in her neck, especially at the end of the day.  She is progressing and appropriate to continue with skilled PT services at this time.    Goal Status:  Pt. will demonstrate/verbalize independence in self-management of condition in : 12 weeks  Pt. will be independent with home exercise program in : 6 weeks    Pt will: be able to perform household chores with less headaches within 8 weeks in order to improve her QOL.  Pt will: be have less than 2 headaches each week within 8 weeks in order to improve her QOL.    Plan / Patient Education:     Continue with initial plan of care.  Progress with home program as tolerated.  Continue with joint mobilizations if helpful.  Progress scapular/postural strengthening as able.    Subjective:     Pain Ratin  The patient reports that she has not noticed many changes.  Her neck  did feel a little looser after last session.  She is getting HA symptoms 1-2x/week.  The pain in the morning is better than in the evening.  The pain is in her neck, shoulder, and arm L > R.    Objective:     Hypomobility C2-C3.    Exercise #1: chin tucks HEP  Comment #1: scap retraction HEP  Exercise #2: doorway pec stretch HEP  Comment #2: Upper cervical rotation self-mob x 3 bilaterally  Exercise #3: UT stretch x 30 seconds bilaterally  Comment #3: Levator stretch x 30 seconds bilaterally    Appt time: 8:17AM - 8:41AM    Treatment Today     TREATMENT MINUTES COMMENTS   Evaluation     Self-care/ Home management     Manual therapy 12 -Prone PA mobilizations grade III x 30 x 3 to C2-C4  -Prone unilateral PA mobilizations grade III x 30 x 3 to C2/3 and C3/4 facet joints bilaterally   Neuromuscular Re-education     Therapeutic Activity     Therapeutic Exercises 12 -Subjective measures taken  -See flow sheet; added UT and levator stretch to HEP for cervical flexibility   Gait training     Modality__________________                Total 24    Blank areas are intentional and mean the treatment did not include these items.       Joanna Gates, PT, DPT  9/17/2020

## 2021-06-11 NOTE — PROGRESS NOTES
"ACUPUNCTURIST TREATMENT NOTE    Name: Mehul Ambriz  :  1965  MRN:  [id number]      Acupuncture Treatment  Patient Type: JN Pain  Intervention Reason: Pain;Wellness  Pre-session Pain Ratin  Post-session Pain Rating: 3  Patient complaint:: Patient is seen for 3rd treatment today for chronic pain in head, neck, shoulders and bilateral arms. Patient states she feels the pain is improving with acupuncture treatment. She has less pain over all than before. Her pain levels will go up and down and move around from headache, to what she describes as pain in her skin, and pain in neck. shoulders, arms. She has some burning and tingling in arms and hands that also comes and goes. Continues with poor sleep as well due to pain and bad dreams. Today she rates pain 5/10.  Acupuncture (Points):: Baihui, [place], Gb8, Gb12, Gb20, Ub10, Gb21, Si10, Si11, Ub15, Lu7, Li4, Ht7, Pc6, Gb34, Sp6, Ki3, Liv3  TCM Diagnosis: Cold Damp Bi Syndrome, Heart Fire, Kidney Hinson Deficiency   Practitioner Observed: 30 minute treatment. Infrared heat lamp over upper back. Tuina with posumon to neck/shoulders.  Checklist: Progress Note Completed;Consent Reveiwed  Follow up comments: Patient relaxed well during treatment.    \"Risks and benefits of acupuncture were discussed with patient. Consent for treatment was given. We thank you for the referral.\"     Neyda Raman L.Ac.    Date:  2020  Time:  10:00 AM    "

## 2021-06-11 NOTE — PROGRESS NOTES
"ACUPUNCTURIST TREATMENT NOTE    Name: Mehul Ambriz  :  1965  MRN:  [id number]      Acupuncture Treatment  Patient Type: JN Pain  Intervention Reason: Pain  Pre-session Pain Ratin  Post-session Pain Ratin  Patient complaint:: Pain in head, neck, upper back and (L) shoulder  Acupuncture (Points):: Du 20, LI 4, LI 10, (L) (GB 20, GB 21, TW 14, LI 15, Reggie Kua, Da Gisselle, SI 3, Sp 9); GB 34, Sp 6, Jillian 3  TCM Diagnosis: Pain due to Cold damp Bi syndrome  Practitioner Observed: 30 minute treatment; infrared lamp over feet; tuina on neck and shoulders  Checklist: Progress Note Completed;Consent Reveiwed  Follow up comments: Patient relaxed and fell asleep during treatment    \"Risks and benefits of acupuncture were discussed with patient. Consent for treatment was given. We thank you for the referral.\"     Mehul was in clinic today for a follow up acupuncture treatment to address pain in her head, neck, upper back and (L) shoulder.  She reported that she is still getting daily headaches and that some days are worse than others.  She reports that when her pain is high she feels dizzy.  She moved her appointment to come in today because she is leaving town to visit family for a few days.  She will schedule her next appointment when she returns.      July Mcguire L.Ac.    Date:  2020  Time:  4:11 PM    "

## 2021-06-11 NOTE — PROGRESS NOTES
"ACUPUNCTURIST TREATMENT NOTE    Name: Mehul Ambriz  :  1965  MRN:  [id number]      Acupuncture Treatment  Patient Type: JN Pain  Intervention Reason: Pain;Wellness  Pre-session Pain Ratin  Post-session Pain Rating: 3  Patient complaint:: Patient is seen for second treatment today with iPad . Patient reports after last treatment the pain relief she felt lasted for \"about two hours\" before pain returned to usual levels in head and neck. Pain is rated 6/10 today in neck, head, shoulders and down bilateral arms just below elbows. Denies tingling or numbness or pain in hands today. Left arm is worse than right.  Acupuncture (Points):: Baihui, [place], Gb8, Du24, [place], Gb20, Ht7, Lu7, St36, Gb34, Sp6, Ki3, Liv3, Gb41  TCM Diagnosis: Cold Damp Bi Syndrome, Heart Fire, Kidney Hinson Deficiency   Practitioner Observed: 30 minute treatment. Infrared heat lamp over feet. Tuina with posumon to neck/shoulders.  Checklist: Progress Note Completed;Consent Reveiwed  Follow up comments: Patient relaxed well during treatment.    \"Risks and benefits of acupuncture were discussed with patient. Consent for treatment was given. We thank you for the referral.\"     Neyda Raman L.Ac.    Date:  2020  Time:  10:01 AM    "

## 2021-06-12 NOTE — PROGRESS NOTES
[facility] Daily Progress Note    Patient Name: Mehul Ambriz  Date: 10/2/2020  Visit #: 5  PTA visit #:  NA  Referral Diagnosis: Chronic nonintractable headache, unspecified headache type  Referring provider: Rober Tavarez MD  Visit Diagnosis:     ICD-10-CM    1. Chronic nonintractable headache, unspecified headache type  R51.9     G89.29    2. Poor posture  R29.3      Assessment from Initial Evaluation:  Mehul Ambriz is a 54 y.o. female who presents to therapy today with chief complaints of chronic headaches. Symptoms began about a year ago without known injury.  Difficulty with house chores due to pain and numbness.  Pain symptoms are intermittent and not improving.  Patient demonstrates signs and sx consistent with poor posture. PT POC and goals have been discussed with patient and She  is agreeable to these. Patient appears motivated for physical therapy and is appropriate for skilled therapy services.    Precautions / Restrictions : none per chart    Assessment:     HEP/POC compliance is  good .  The patient demonstrates overall improvements in her pain level both in her shoulder and her neck.  She is getting 3 HA/week which is improved since beginning PT.  She has a good understanding of her HEP and is appropriate to continue with skilled PT services at this time.  She will be going out of town for the next 2 weeks and will resume PT when she returns.    Goal Status:  Pt. will demonstrate/verbalize independence in self-management of condition in : 12 weeks  Pt. will be independent with home exercise program in : 6 weeks    Pt will: be able to perform household chores with less headaches within 8 weeks in order to improve her QOL.  Pt will: be have less than 2 headaches each week within 8 weeks in order to improve her QOL.    Plan / Patient Education:     Continue with initial plan of care.  Progress with home program as tolerated.  Continue with joint mobilizations if helpful.  Progress  scapular/postural strengthening as able.  Shoulder strengthening PRN.    Subjective:     Pain Ratin in shoulder, 3/10 in neck/HA  The patient reports that she continues to notice some changes.  The pain is a little bit less.  Her HA are improved also.  Her shoulder is still a problem.  Pain is 3-4 in the morning and then 6-7 in the evening.  The pain comes and goes.  The neck is better after treatment and the pain comes back after a few hours.  Her pain level changes with her mood.  She has 3 HA/week.    Objective:     Hypomobility C2-C3.  Pain with R side bending cervical spine on the R.  Pulling R with L rotation cervical spine.  Shoulder ROM is WNL.  Mild pain with transition from shoulder ER to IR.    Exercise #1: chin tucks with head lift x 10; cues for technique  Comment #1: scap retraction HEP; also added scapular rows x 10 with L2 band  Exercise #2: doorway pec stretch HEP  Comment #2: Upper cervical rotation self-mob HEP  Exercise #3: UT stretch HEP  Comment #3: Levator stretch HEP  Exercise #4: Shoulder AAROM: flexion HEP  Comment #4: Shoulder ER with L1 band x 5    Appt time: 8:45AM - 9:14AM    Treatment Today     TREATMENT MINUTES COMMENTS   Evaluation     Self-care/ Home management     Manual therapy 13 -Prone PA mobilizations grade III x 30 x 3 to C2-C4  -Prone unilateral PA mobilizations grade III x 30 x 3 to C2/3 and C3/4 facet joints bilaterally   Neuromuscular Re-education     Therapeutic Activity     Therapeutic Exercises 16 -Subjective measures taken  -See flow sheet; review of HEP and progressed cervical and scapular strengthening as patient was ready to progress   Gait training     Modality__________________                Total 29    Blank areas are intentional and mean the treatment did not include these items.       Joanna Gates, PT, DPT  10/2/2020

## 2021-06-12 NOTE — TELEPHONE ENCOUNTER
Spoke with patient.  She wasn't able to make her appointment today.  This is second NS.  She said she is able to come to next week's appointment.  Joanna Gates, PT, DPT

## 2021-06-12 NOTE — TELEPHONE ENCOUNTER
Spoke with patient regarding NS.  She forgot and by the time she remembered, it was too late to come.  RS her next appt for later in the day.  She is aware of her next scheduled appointment.  Joanna Gates, PT, DPT

## 2021-06-12 NOTE — PROGRESS NOTES
[facility] Daily Progress / Discharge Summary    Patient Name: Mehul Ambriz  Date: 11/3/2020  Visit #: 6  PTA visit #:  NA  Referral Diagnosis:  Chronic nonintractable headache, unspecified headache type  Referring provider: Rboer Tavarez MD  Visit Diagnosis:     ICD-10-CM    1. Chronic nonintractable headache, unspecified headache type  R51.9     G89.29    2. Poor posture  R29.3        Assessment:   HEP/POC compliance is  fair .  The patient would like to be done with physical therapy after today's session.  She is going to be working out at her  and continuing with acupuncture treatments.  She has a good understanding of her HEP but had not been doing her newer exercises since last session.  She has not met all PT goals but is appropriate for DC at this time.    Goal Status:  Pt. will demonstrate/verbalize independence in self-management of condition in : 12 weeks;Met  Pt. will be independent with home exercise program in : 6 weeks;Met    Pt will: be able to perform household chores with less headaches within 8 weeks in order to improve her QOL; Not Met  Pt will: be have less than 2 headaches each week within 8 weeks in order to improve her QOL; Not Met; some weeks are better than others    Plan / Patient Education:     No further therapy is required at this time.  The patient will initiate contact if questions or concerns arise.  The patient would like to be done with PT today.  She is going to continue independently with her HEP and will return to PT if she needs to in the future.    Subjective:     Pain Ratin  The patient reports that she thinks her symptoms are a little better.  She thinks today will be her last day of PT.  She will be going to [facility]  and will be doing exercising and massage chair.  The pain in the morning is always better than toward the end of the day.  The HA depend on her overall mood.  Her shoulder continues to bother her.  She is doing her  acupuncture consistently as well.    Objective:     No pain with cervical ROM.  Shoulder ROM is WNL.  Hypomobility C2-C3.    Exercise #1: chin tucks with head lift x 10; cues for technique  Comment #1: scap retraction HEP; scapular rows x 10 with L3 band  Exercise #2: doorway pec stretch HEP  Comment #2: Upper cervical rotation self-mob HEP  Exercise #3: UT stretch HEP  Comment #3: Levator stretch HEP  Exercise #4: Shoulder AAROM: flexion HEP  Comment #4: Shoulder ER with L2 band    Appt time: 10:12AM - 10:42AM    Treatment Today  TREATMENT MINUTES COMMENTS   Evaluation     Self-care/ Home management     Manual therapy 15 -Prone PA mobilizations grade III x 30 x 3 to C2-C4  -Prone unilateral PA mobilizations grade III x 30 x 3 to C2/3 and C3/4 facet joints bilaterally   Neuromuscular Re-education     Therapeutic Activity     Therapeutic Exercises 15 -Subjective measures taken  -See flow sheet; review of HEP  -Discussed POC   Gait training     Modality__________________                Total 30    Blank areas are intentional and mean the treatment did not include these items.     Joanna Gates, PT, DPT  11/3/2020

## 2021-06-12 NOTE — PROGRESS NOTES
"ACUPUNCTURIST TREATMENT NOTE    Name: Mehul Ambriz  :  1965  MRN:  [id number]      Acupuncture Treatment  Patient Type: JN Pain  Intervention Reason: Pain  Pre-session Pain Ratin  Post-session Pain Rating: 3  Patient complaint:: Pain in head, neck, (L) shoulder, (R) arm and midback.  Acupuncture (Points):: Du 20, SI 12, SI 10, SI 11, TW 14, LI 3, GB 34, (L) St 38, Sp 6, BL 13, BL 15, BL 17, BL 19)  TCM Diagnosis: Pain due to cold damp Bi syndrome; heart fire, Kidney casey deficiency  Practitioner Observed: 30 minute treatment; infrared lamp over upper back; tui na with Posumon on upper back and shoulders  Checklist: Progress Note Completed;Consent Reveiwed  Follow up comments: Patient relaxed well during treatment session today    \"Risks and benefits of acupuncture were discussed with patient. Consent for treatment was given. We thank you for the referral.\"     Mehul is in clinic today for a follow up acupuncture visit.  Mehul has just returned from visiting relatives in [place] for 14 days.  Her son drove her to [place] and she flew home.  Mehul explains that the trip was more stressful than it was relaxing for her.  She explains that the emotional and physical stress of travel have exacerbated her symptoms.  Today she rates her pain at 6/10.  She reports that she has pain in her neck, her (L) shoulder, her (R) arm in area of her elbow, and pain in her midback.  She also reports that her headaches have remained the same, she denies improvement in frequency, duration, or pain level.  Mehul denies a headache at time of treatment today.  Mehul explains that her pain is best in the morning after having rested during the night.  She explains that the pain slowly and progressively gets worse during the day and is at its worst between 10pm-12am which causes her difficulty with sleeping.  Mehul explains that the pain is not correlated to daily activities because she says that she doesn't do any work.  Mehul explains " "that the pain is better with a warm/hot shower or bath because the warm water \"goes everywhere the pain is.\"  She reports that a heating pad or warm rice pack only helps moderately because it can't be everywhere at once like warm/hot water can in a bath or shower.  Mehul continues to have difficulty sleeping due to bad dreams and the pain.      July Mcguire L.Ac.     Date:  10/19/2020  Time:  9:34 AM    "

## 2021-06-17 NOTE — TELEPHONE ENCOUNTER
Patient is overdue for preventive care appointment, including mammo, pap, colon cancer screening, and possibly immunizations.    Left voice message requesting return call to [facility] at [phone] to schedule preventive care appointment.

## 2021-06-18 NOTE — PATIENT INSTRUCTIONS - HE
Patient Instructions by Joanna Gates PT at 9/11/2020  9:15 AM     Author: Joanna Gates PT Service: -- Author Type: Physical Therapist    Filed: 9/11/2020  9:43 AM Encounter Date: 9/11/2020 Status: Signed    : Joanna Gates PT (Physical Therapist)        Upper Cervical Rotation Self Mobilization     With your arms crossed hold the towel firmly to your chest and the other hand has the towel pressed against your check bone. Pull the towel across your cheekbone with the towel doing the work and your neck feeling the stretch.    Hold 5-10 seconds  x5-10 each direction

## 2021-06-18 NOTE — PATIENT INSTRUCTIONS - HE
Patient Instructions by Joanna Gates PT at 9/22/2020  7:45 AM     Author: Joanna Gates PT Service: -- Author Type: Physical Therapist    Filed: 9/22/2020  8:11 AM Encounter Date: 9/22/2020 Status: Signed    : Joanna Gates PT (Physical Therapist)        ELASTIC BAND BILATERAL EXTERNAL ROTATION    While holding an elastic band with your elbows bent, pull your hands away from your stomach area. Keep  your elbows near the side of your body.    x20-25 repetitions  1-2 sets      WAND FLEXION  - SUPINE    Lying on  your back and holding a wand or cane, slowly raise the wand towards overhead.    x10-15 repetitions  1-2 sets  3x/day

## 2021-06-18 NOTE — PATIENT INSTRUCTIONS - HE
Patient Instructions by Radha Ramirez PT at 9/4/2020  8:30 AM     Author: Radha Ramirez PT Service: -- Author Type: Physical Therapist    Filed: 9/4/2020  9:16 AM Encounter Date: 9/4/2020 Status: Signed    : Radha Ramirez PT (Physical Therapist)       Use ice and heat as needed.     RETRACTION / CHIN TUCK    Slowly draw your head back so that your ears line up with your shoulders. Hold for 5 seconds. Do 10 repetitions at a time. Perform 2x/day      SCAPULAR RETRACTIONS    Draw your shoulder blades back and down. Hold for 5 seconds. Do 10 repetitions at a time, 2x/day      DOORWAY STRETCH - HIGH    While standing in a doorway, place your arms up on the door frame and lean in until a stretch is felt along the front of your chest and/or shoulders. Your upper arms should be placed upward along the door frame. Hold for 30 seconds. Do 3 times. Perform 2x/day    NOTE: Your legs should control how much you stretch by bending or straightening your knee through the doorway.

## 2021-06-18 NOTE — PATIENT INSTRUCTIONS - HE
Patient Instructions by Joanna Gates PT at 11/3/2020 10:15 AM     Author: Joanna Gates PT Service: -- Author Type: Physical Therapist    Filed: 11/3/2020 10:41 AM Encounter Date: 11/3/2020 Status: Signed    : Joanna Gates PT (Physical Therapist)        ELASTIC BAND ROWS     Holding elastic band with both hands, draw back the band as you bend your elbows. Keep your elbows near the side of your body.    Hold 5 seconds  x10-15 repetitions  1-2 sets        CHIN TUCK HEAD LIFT  Perform chin tuck and then raise head up 1-2 inches off surface.  Slowly lower head raise and then relax chin tuck.    Hold 5 seconds  x10-15 repetitions  1-2 sets

## 2021-06-18 NOTE — PATIENT INSTRUCTIONS - HE
Patient Instructions by Joanna Gates PT at 10/2/2020  8:45 AM     Author: Joanna Gates PT Service: -- Author Type: Physical Therapist    Filed: 10/2/2020  9:13 AM Encounter Date: 10/2/2020 Status: Signed    : Joanna Gates PT (Physical Therapist)        ELASTIC BAND ROWS     Holding elastic band with both hands, draw back the band as you bend your elbows. Keep your elbows near the side of your body.    Hold 5 seconds  x10-15 repetitions  1-2 sets        CHIN TUCK HEAD LIFT  Perform chin tuck and then raise head up 1-2 inches off surface.  Slowly lower head raise and then relax chin tuck.    Hold 5 seconds  x10-15 repetitions  1-2 sets

## 2021-06-18 NOTE — PATIENT INSTRUCTIONS - HE
Patient Instructions by Joanna Gates PT at 9/17/2020  8:15 AM     Author: Joanna Gates PT Service: -- Author Type: Physical Therapist    Filed: 9/17/2020  8:39 AM Encounter Date: 9/17/2020 Status: Signed    : Joanna Gates PT (Physical Therapist)        UPPER TRAP STRETCH    Begin by retracting your head back into a chin tuck position. Next, place one hand behind your back and gently draw your head towards the opposite side with the help of your other arm.    Hold 30 seconds x 2 each side      LEVATOR SCAPULAE STRETCH    Place the arm on the affected side behind your back and use your other hand to draw your head downward and towards the opposite side.     You should be looking towards your armpit.    Hold 30 seconds x 2 each side

## 2021-06-29 NOTE — PROGRESS NOTES
Progress Notes by Radha Ramirez PT at 9/4/2020  8:30 AM     Author: Radha Ramirez PT Service: -- Author Type: Physical Therapist    Filed: 9/4/2020  9:37 AM Encounter Date: 9/4/2020 Status: Attested    : Radha Ramirez PT (Physical Therapist) Cosigner: Rober Tavarez MD at 9/8/2020  8:35 AM    Attestation signed by Rober Tavarez MD at 9/8/2020  8:35 AM    agree                    [facility] Certification Request    September 4, 2020      Patient: Mehul Ambriz  MR Number: [id number]  YOB: 1965  Date of Visit: 9/4/2020      Dear Dr. Tavarez,    Thank you for this referral.   We are seeing Mehul Ambriz for Physical Therapy of headaches.    Medicare and/or Medicaid requires physician review and approval of the treatment plan. Please review the plan of care and verify that you agree with the therapy plan of care by co-signing this note.      Plan of Care  Authorization / Certification Start Date: 09/04/20  Authorization / Certification End Date: 12/04/20  Authorization / Certification Number of Visits: 10  Communication with: Referral Source  Patient Related Instruction: Nature of Condition;Precautions;Next steps;Treatment plan and rationale;Expected outcome;Self Care instruction;Basis of treatment;Body mechanics;Posture  Times per Week: 1  Number of Weeks: 12  Number of Visits: 10  Discharge Planning: when PT goals are met  Precautions / Restrictions : none per chart  Therapeutic Exercise: ROM;Stretching;Strengthening  Neuromuscular Reeducation: core;posture;kinesio tape  Manual Therapy: soft tissue mobilization;myofascial release;joint mobilization;muscle energy  Equipment: theraband      Goals:  Pt. will demonstrate/verbalize independence in self-management of condition in : 12 weeks  Pt. will be independent with home exercise program in : 6 weeks    Pt will: be able to perform household chores with less headaches within 8 weeks in order to improve her QOL.  Pt will: be have  less than 2 headaches each week within 8 weeks in order to improve her QOL.        If you have any questions or concerns, please don't hesitate to call.    Sincerely,      Radha Ramirez, PT, DPT        Physician recommendation:     ___ Follow therapist's recommendation        ___ Modify therapy      *Physician co-signature indicates they certify the need for these services furnished within this plan and while under their care.      [facility]  Cervical Thoracic Initial Evaluation    Patient Name: Mehul Ambriz  Date of evaluation: 9/4/2020  Referral Diagnosis: Chronic nonintractable headache, unspecified headache type  Referring provider: Rober Tavarez MD  Visit Diagnosis:     ICD-10-CM    1. Headache  R51    2. Poor posture  R29.3        History reviewed. No pertinent past medical history.    Assessment:        Mehul Ambriz is a 54 y.o. female who presents to therapy today with chief complaints of chronic headaches. Symptoms began about a year ago without known injury.  Difficulty with house chores due to pain and numbness.  Pain symptoms are intermittent and not improving.  Patient demonstrates signs and sx consistent with poor posture. PT POC and goals have been discussed with patient and She  is agreeable to these. Patient appears motivated for physical therapy and is appropriate for skilled therapy services.    The POC is dynamic and will be modified on an ongoing basis.  Barriers to achieving goals as noted in the assessment section may affect outcome.  Prognosis to achieve goals is  fair   Pt. is appropriate for skilled PT intervention as outlined in the Plan of Care (POC).  Pt. is a good candidate for skilled PT services to improve pain levels and function.  Plan of care and goals were established in collaboration with patient.       Goals:  Pt. will demonstrate/verbalize independence in self-management of condition in : 12 weeks  Pt. will be independent with home exercise program in : 6 weeks    Pt  will: be able to perform household chores with less headaches within 8 weeks in order to improve her QOL.  Pt will: be have less than 2 headaches each week within 8 weeks in order to improve her QOL.      Patient's expectations/goals are realistic.    Barriers to Learning or Achieving Goals:  Chronicity of the problem.  Co-morbidities or other medical factors.  see [facility]       Plan / Patient Instructions:        Plan of Care:   Authorization / Certification Start Date: 09/04/20  Authorization / Certification End Date: 12/04/20  Authorization / Certification Number of Visits: 10  Communication with: Referral Source  Patient Related Instruction: Nature of Condition;Precautions;Next steps;Treatment plan and rationale;Expected outcome;Self Care instruction;Basis of treatment;Body mechanics;Posture  Times per Week: 1  Number of Weeks: 12  Number of Visits: 10  Discharge Planning: when PT goals are met  Precautions / Restrictions : none per chart  Therapeutic Exercise: ROM;Stretching;Strengthening  Neuromuscular Reeducation: core;posture;kinesio tape  Manual Therapy: soft tissue mobilization;myofascial release;joint mobilization;muscle energy  Equipment: [employer]      POC and pathology of condition were reviewed with patient.  Pt. is in agreement with the Plan of Care  A Home Exercise Program (HEP) was initiated today.  Pt. was instructed in exercises by PT and patient was given a handout with detailed instructions.    Plan for next visit: continue w/ postural exercises     Subjective:       Social information:   Occupation: not working   Hobbies: walking      History of Present Illness:    Mehul is a 54 y.o. female who presents to therapy today with complaints of chronic headaches and pain on the left upper back/shoulder area. Date of onset/duration of symptoms is a year ago without known injury. Symptoms are intermittent and not improving. Gets numbness down into left mid forearm and into L upper back. Has massaged the  area to help it feel better. Activity doesn't really make the pain worse.    Pain Ratin/10  Pain rating at worst: 9  Pain description: poking, heavy, numb    Functional limitations are described as occurring with: house chores         Objective:      Note: Items left blank indicates the item was not performed or not indicated at the time of the evaluation.      Cervical Thoracic Examination  1. Headache     2. Poor posture       Involved side: Left  Posture Observation: severely protracted head and shoulders  Gait: normal, no AD    Cervical ROM:  All AFL, no increase in pain  Date: 2020     *Indicate scale AROM AROM AROM   Cervical Flexion      Cervical Extension       Right Left Right Left Right Left   Cervical Sidebending         Cervical Rotation         Cervical Protraction      Cervical Retraction      Thoracic Flexion      Thoracic Extension      Thoracic Sidebending         Thoracic Rotation           Strength   5/5  Date: 2020     Cervical Myotomes/5 Right Left Right Left Right Left   Cervical Flexion (C1-2)         Cervical Sidebending (C3)         Shoulder Elevation (C4)         Shoulder Abduction (C5)         Elbow Flexion (C6)         Elbow Extension (C7)         Wrist Flexion (C7)         Wrist Extension (C6)         Thumb abduction (C8)         Finger Abduction (T1)           Sensation   Not intact in L UE per subjective  Palpation: tender T5-7 spinous processes      Cervical Special Tests     Cervical Special Tests Right Left UE Nerve Mobility Right Left   Cervical compression   Median nerve - -   Cervical distraction   Ulnar nerve - -   Spurlings test   Radial nerve - -   Shoulder abduction sign   Thoracic outlet     Deep neck flexor endurance test   Orly     Upper cervical rotation   Adsons     Sharper-Cass   Cervical rotation lateral flexion     Alar ligament test   Other:     Other:   Other:       UE Screen: all WFL B and symmetrical    Treatment Today     TREATMENT MINUTES COMMENTS    Evaluation 22 Discussed PT POC and pathology of condition.    Self-care/ Home management 8 Answered patient questions regarding management of condition. Recommend patient use ice/heat as needed. Discussed importance of good posture.   Manual therapy     Neuromuscular Re-education     Therapeutic Activity     Therapeutic Exercises 12 Began HEP:  Exercises:  Exercise #1: chin tucks x10  Comment #1: scap retraction x10  Exercise #2: doorway pec stretch x3      Gait training     Modality__________________                Total 42    Blank areas are intentional and mean the treatment did not include these items.     PT Evaluation Code: (Please list factors)  Patient History/Comorbidities: see PMH.  Examination: headaches  Clinical Presentation: stable  Clinical Decision Making: low    Patient History/  Comorbidities Examination  (body structures and functions, activity limitations, and/or participation restrictions) Clinical Presentation Clinical Decision Making (Complexity)   No documented Comorbidities or personal factors 1-2 Elements Stable and/or uncomplicated Low   1-2 documented comorbidities or personal factor 3 Elements Evolving clinical presentation with changing characteristics Moderate   3-4 documented comorbidities or personal factors 4 or more Unstable and unpredictable High                Radha Ramirez, PT, DPT  9/4/2020  8:44 AM

## 2021-07-03 NOTE — ADDENDUM NOTE
Addendum Note by Ivett Tavarez MD at 6/9/2020  1:13 PM     Author: Ivett Tavarez MD Service: -- Author Type: Physician    Filed: 6/9/2020  1:13 PM Encounter Date: 6/2/2020 Status: Signed    : Ivett Tavarez MD (Physician)    Addended by: IVETT TAVAREZ on: 6/9/2020 01:13 PM        Modules accepted: Orders

## 2021-08-06 ENCOUNTER — TELEPHONE (OUTPATIENT)
Dept: FAMILY MEDICINE | Facility: CLINIC | Age: 56
End: 2021-08-06

## 2021-08-06 NOTE — TELEPHONE ENCOUNTER
Last outreach 4/28/21.  Patient is overdue for preventive care appointment, including mammo, pap, colon cancer screening, and possibly immunizations.     Left voice message requesting return call to [facility] at [phone] to schedule preventive care appointment.

## 2022-02-25 ENCOUNTER — MEDICAL CORRESPONDENCE (OUTPATIENT)
Dept: HEALTH INFORMATION MANAGEMENT | Facility: CLINIC | Age: 57
End: 2022-02-25
Payer: COMMERCIAL

## 2023-08-22 ENCOUNTER — APPOINTMENT (OUTPATIENT)
Dept: CT IMAGING | Facility: HOSPITAL | Age: 58
End: 2023-08-22
Attending: STUDENT IN AN ORGANIZED HEALTH CARE EDUCATION/TRAINING PROGRAM
Payer: COMMERCIAL

## 2023-08-22 ENCOUNTER — HOSPITAL ENCOUNTER (EMERGENCY)
Facility: HOSPITAL | Age: 58
Discharge: HOME | End: 2023-08-22
Attending: STUDENT IN AN ORGANIZED HEALTH CARE EDUCATION/TRAINING PROGRAM | Admitting: STUDENT IN AN ORGANIZED HEALTH CARE EDUCATION/TRAINING PROGRAM
Payer: COMMERCIAL

## 2023-08-22 VITALS
WEIGHT: 130 LBS | HEART RATE: 54 BPM | OXYGEN SATURATION: 98 % | HEIGHT: 55 IN | DIASTOLIC BLOOD PRESSURE: 78 MMHG | SYSTOLIC BLOOD PRESSURE: 140 MMHG | TEMPERATURE: 98.6 F | RESPIRATION RATE: 20 BRPM | BODY MASS INDEX: 30.09 KG/M2

## 2023-08-22 DIAGNOSIS — S09.90XA CLOSED HEAD INJURY, INITIAL ENCOUNTER: ICD-10-CM

## 2023-08-22 DIAGNOSIS — S01.01XA SCALP LACERATION, INITIAL ENCOUNTER: ICD-10-CM

## 2023-08-22 PROCEDURE — 250N000011 HC RX IP 250 OP 636: Performed by: STUDENT IN AN ORGANIZED HEALTH CARE EDUCATION/TRAINING PROGRAM

## 2023-08-22 PROCEDURE — 12001 RPR S/N/AX/GEN/TRNK 2.5CM/<: CPT

## 2023-08-22 PROCEDURE — 90715 TDAP VACCINE 7 YRS/> IM: CPT | Performed by: STUDENT IN AN ORGANIZED HEALTH CARE EDUCATION/TRAINING PROGRAM

## 2023-08-22 PROCEDURE — 99284 EMERGENCY DEPT VISIT MOD MDM: CPT | Mod: 25

## 2023-08-22 PROCEDURE — 70450 CT HEAD/BRAIN W/O DYE: CPT

## 2023-08-22 PROCEDURE — 250N000009 HC RX 250: Performed by: STUDENT IN AN ORGANIZED HEALTH CARE EDUCATION/TRAINING PROGRAM

## 2023-08-22 PROCEDURE — 90471 IMMUNIZATION ADMIN: CPT | Performed by: STUDENT IN AN ORGANIZED HEALTH CARE EDUCATION/TRAINING PROGRAM

## 2023-08-22 PROCEDURE — 72125 CT NECK SPINE W/O DYE: CPT

## 2023-08-22 RX ADMIN — Medication 3 ML: at 21:19

## 2023-08-22 RX ADMIN — CLOSTRIDIUM TETANI TOXOID ANTIGEN (FORMALDEHYDE INACTIVATED), CORYNEBACTERIUM DIPHTHERIAE TOXOID ANTIGEN (FORMALDEHYDE INACTIVATED), BORDETELLA PERTUSSIS TOXOID ANTIGEN (GLUTARALDEHYDE INACTIVATED), BORDETELLA PERTUSSIS FILAMENTOUS HEMAGGLUTININ ANTIGEN (FORMALDEHYDE INACTIVATED), BORDETELLA PERTUSSIS PERTACTIN ANTIGEN, AND BORDETELLA PERTUSSIS FIMBRIAE 2/3 ANTIGEN 0.5 ML: 5; 2; 2.5; 5; 3; 5 INJECTION, SUSPENSION INTRAMUSCULAR at 21:20

## 2023-08-22 ASSESSMENT — ACTIVITIES OF DAILY LIVING (ADL): ADLS_ACUITY_SCORE: 35

## 2023-08-22 ASSESSMENT — ENCOUNTER SYMPTOMS
NECK PAIN: 1
MYALGIAS: 1
HEADACHES: 1
WOUND: 1
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
## 2023-08-31 ENCOUNTER — HOSPITAL ENCOUNTER (EMERGENCY)
Facility: HOSPITAL | Age: 58
Discharge: HOME | End: 2023-08-31
Attending: EMERGENCY MEDICINE | Admitting: EMERGENCY MEDICINE
Payer: COMMERCIAL

## 2023-08-31 VITALS
OXYGEN SATURATION: 98 % | SYSTOLIC BLOOD PRESSURE: 106 MMHG | WEIGHT: 130.07 LBS | TEMPERATURE: 97.9 F | RESPIRATION RATE: 12 BRPM | BODY MASS INDEX: 30.23 KG/M2 | HEART RATE: 54 BPM | DIASTOLIC BLOOD PRESSURE: 66 MMHG

## 2023-08-31 DIAGNOSIS — Z48.02 ENCOUNTER FOR STAPLE REMOVAL: ICD-10-CM

## 2023-08-31 PROCEDURE — 99281 EMR DPT VST MAYX REQ PHY/QHP: CPT
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
